# Patient Record
Sex: FEMALE | Race: WHITE | HISPANIC OR LATINO | Employment: PART TIME | URBAN - METROPOLITAN AREA
[De-identification: names, ages, dates, MRNs, and addresses within clinical notes are randomized per-mention and may not be internally consistent; named-entity substitution may affect disease eponyms.]

---

## 2020-10-23 ENCOUNTER — HOSPITAL ENCOUNTER (EMERGENCY)
Facility: HOSPITAL | Age: 40
Discharge: HOME | End: 2020-10-23
Attending: EMERGENCY MEDICINE
Payer: COMMERCIAL

## 2020-10-23 VITALS
OXYGEN SATURATION: 97 % | SYSTOLIC BLOOD PRESSURE: 114 MMHG | HEART RATE: 104 BPM | DIASTOLIC BLOOD PRESSURE: 80 MMHG | TEMPERATURE: 98 F | WEIGHT: 185.85 LBS | RESPIRATION RATE: 16 BRPM

## 2020-10-23 DIAGNOSIS — Z20.822 SUSPECTED COVID-19 VIRUS INFECTION: ICD-10-CM

## 2020-10-23 DIAGNOSIS — B34.9 VIRAL SYNDROME: Primary | ICD-10-CM

## 2020-10-23 PROCEDURE — U0003 INFECTIOUS AGENT DETECTION BY NUCLEIC ACID (DNA OR RNA); SEVERE ACUTE RESPIRATORY SYNDROME CORONAVIRUS 2 (SARS-COV-2) (CORONAVIRUS DISEASE [COVID-19]), AMPLIFIED PROBE TECHNIQUE, MAKING USE OF HIGH THROUGHPUT TECHNOLOGIES AS DESCRIBED BY CMS-2020-01-R: HCPCS | Performed by: EMERGENCY MEDICINE

## 2020-10-23 PROCEDURE — 99282 EMERGENCY DEPT VISIT SF MDM: CPT | Performed by: EMERGENCY MEDICINE

## 2020-10-23 PROCEDURE — 99283 EMERGENCY DEPT VISIT LOW MDM: CPT

## 2020-10-23 RX ORDER — ACETAMINOPHEN 325 MG/1
975 TABLET ORAL ONCE
Status: COMPLETED | OUTPATIENT
Start: 2020-10-23 | End: 2020-10-23

## 2020-10-23 RX ADMIN — ACETAMINOPHEN 975 MG: 325 TABLET ORAL at 13:26

## 2020-10-24 LAB — SARS-COV-2 RNA SPEC QL NAA+PROBE: DETECTED

## 2021-04-09 ENCOUNTER — APPOINTMENT (OUTPATIENT)
Dept: LAB | Facility: CLINIC | Age: 41
End: 2021-04-09
Payer: COMMERCIAL

## 2021-04-09 ENCOUNTER — OFFICE VISIT (OUTPATIENT)
Dept: FAMILY MEDICINE CLINIC | Facility: CLINIC | Age: 41
End: 2021-04-09

## 2021-04-09 VITALS
RESPIRATION RATE: 16 BRPM | WEIGHT: 187 LBS | HEIGHT: 67 IN | BODY MASS INDEX: 29.35 KG/M2 | TEMPERATURE: 97.8 F | DIASTOLIC BLOOD PRESSURE: 80 MMHG | OXYGEN SATURATION: 97 % | HEART RATE: 71 BPM | SYSTOLIC BLOOD PRESSURE: 106 MMHG

## 2021-04-09 DIAGNOSIS — Z00.01 ENCOUNTER FOR GENERAL ADULT MEDICAL EXAMINATION WITH ABNORMAL FINDINGS: Primary | ICD-10-CM

## 2021-04-09 DIAGNOSIS — E03.8 OTHER SPECIFIED HYPOTHYROIDISM: ICD-10-CM

## 2021-04-09 DIAGNOSIS — K21.9 GASTROESOPHAGEAL REFLUX DISEASE WITHOUT ESOPHAGITIS: ICD-10-CM

## 2021-04-09 DIAGNOSIS — R04.0 FREQUENT NOSEBLEEDS: ICD-10-CM

## 2021-04-09 DIAGNOSIS — R23.3 BRUISING, SPONTANEOUS: ICD-10-CM

## 2021-04-09 DIAGNOSIS — Z76.89 ENCOUNTER TO ESTABLISH CARE: ICD-10-CM

## 2021-04-09 DIAGNOSIS — Z00.00 HEALTHCARE MAINTENANCE: ICD-10-CM

## 2021-04-09 DIAGNOSIS — Z78.9 TELEPHONE LANGUAGE INTERPRETER SERVICE REQUIRED: ICD-10-CM

## 2021-04-09 LAB
25(OH)D3 SERPL-MCNC: 18.3 NG/ML (ref 30–100)
ALBUMIN SERPL BCP-MCNC: 3.7 G/DL (ref 3.5–5)
ALP SERPL-CCNC: 65 U/L (ref 46–116)
ALT SERPL W P-5'-P-CCNC: 33 U/L (ref 12–78)
ANION GAP SERPL CALCULATED.3IONS-SCNC: 5 MMOL/L (ref 4–13)
APTT PPP: 34 SECONDS (ref 23–37)
AST SERPL W P-5'-P-CCNC: 18 U/L (ref 5–45)
BASOPHILS # BLD AUTO: 0.02 THOUSANDS/ΜL (ref 0–0.1)
BASOPHILS NFR BLD AUTO: 0 % (ref 0–1)
BILIRUB SERPL-MCNC: 0.38 MG/DL (ref 0.2–1)
BUN SERPL-MCNC: 13 MG/DL (ref 5–25)
CALCIUM SERPL-MCNC: 9 MG/DL (ref 8.3–10.1)
CHLORIDE SERPL-SCNC: 106 MMOL/L (ref 100–108)
CHOLEST SERPL-MCNC: 226 MG/DL (ref 50–200)
CO2 SERPL-SCNC: 29 MMOL/L (ref 21–32)
CREAT SERPL-MCNC: 0.7 MG/DL (ref 0.6–1.3)
D DIMER PPP FEU-MCNC: 0.31 UG/ML FEU
EOSINOPHIL # BLD AUTO: 0.16 THOUSAND/ΜL (ref 0–0.61)
EOSINOPHIL NFR BLD AUTO: 3 % (ref 0–6)
ERYTHROCYTE [DISTWIDTH] IN BLOOD BY AUTOMATED COUNT: 13.2 % (ref 11.6–15.1)
GFR SERPL CREATININE-BSD FRML MDRD: 110 ML/MIN/1.73SQ M
GLUCOSE P FAST SERPL-MCNC: 64 MG/DL (ref 65–99)
HCT VFR BLD AUTO: 40.1 % (ref 34.8–46.1)
HDLC SERPL-MCNC: 78 MG/DL
HGB BLD-MCNC: 12.6 G/DL (ref 11.5–15.4)
IMM GRANULOCYTES # BLD AUTO: 0.01 THOUSAND/UL (ref 0–0.2)
IMM GRANULOCYTES NFR BLD AUTO: 0 % (ref 0–2)
INR PPP: 0.96 (ref 0.84–1.19)
LDLC SERPL CALC-MCNC: 133 MG/DL (ref 0–100)
LYMPHOCYTES # BLD AUTO: 1.8 THOUSANDS/ΜL (ref 0.6–4.47)
LYMPHOCYTES NFR BLD AUTO: 33 % (ref 14–44)
MCH RBC QN AUTO: 28.5 PG (ref 26.8–34.3)
MCHC RBC AUTO-ENTMCNC: 31.4 G/DL (ref 31.4–37.4)
MCV RBC AUTO: 91 FL (ref 82–98)
MONOCYTES # BLD AUTO: 0.4 THOUSAND/ΜL (ref 0.17–1.22)
MONOCYTES NFR BLD AUTO: 7 % (ref 4–12)
NEUTROPHILS # BLD AUTO: 3.14 THOUSANDS/ΜL (ref 1.85–7.62)
NEUTS SEG NFR BLD AUTO: 57 % (ref 43–75)
NONHDLC SERPL-MCNC: 148 MG/DL
NRBC BLD AUTO-RTO: 0 /100 WBCS
PLATELET # BLD AUTO: 277 THOUSANDS/UL (ref 149–390)
PMV BLD AUTO: 9.9 FL (ref 8.9–12.7)
POTASSIUM SERPL-SCNC: 4.2 MMOL/L (ref 3.5–5.3)
PROT SERPL-MCNC: 7.6 G/DL (ref 6.4–8.2)
PROTHROMBIN TIME: 12.8 SECONDS (ref 11.6–14.5)
RBC # BLD AUTO: 4.42 MILLION/UL (ref 3.81–5.12)
SODIUM SERPL-SCNC: 140 MMOL/L (ref 136–145)
TRIGL SERPL-MCNC: 73 MG/DL
TSH SERPL DL<=0.05 MIU/L-ACNC: 2.5 UIU/ML (ref 0.36–3.74)
WBC # BLD AUTO: 5.53 THOUSAND/UL (ref 4.31–10.16)

## 2021-04-09 PROCEDURE — 80053 COMPREHEN METABOLIC PANEL: CPT

## 2021-04-09 PROCEDURE — 84443 ASSAY THYROID STIM HORMONE: CPT

## 2021-04-09 PROCEDURE — 85025 COMPLETE CBC W/AUTO DIFF WBC: CPT

## 2021-04-09 PROCEDURE — 85379 FIBRIN DEGRADATION QUANT: CPT

## 2021-04-09 PROCEDURE — 80061 LIPID PANEL: CPT

## 2021-04-09 PROCEDURE — 36415 COLL VENOUS BLD VENIPUNCTURE: CPT

## 2021-04-09 PROCEDURE — 99204 OFFICE O/P NEW MOD 45 MIN: CPT | Performed by: NURSE PRACTITIONER

## 2021-04-09 PROCEDURE — 85730 THROMBOPLASTIN TIME PARTIAL: CPT

## 2021-04-09 PROCEDURE — 82306 VITAMIN D 25 HYDROXY: CPT

## 2021-04-09 PROCEDURE — 85610 PROTHROMBIN TIME: CPT

## 2021-04-09 RX ORDER — OXYMETAZOLINE HYDROCHLORIDE 0.05 G/100ML
2 SPRAY NASAL 2 TIMES DAILY
Qty: 30 ML | Refills: 0 | Status: SHIPPED | OUTPATIENT
Start: 2021-04-09

## 2021-04-09 RX ORDER — FAMOTIDINE 20 MG/1
20 TABLET, FILM COATED ORAL 2 TIMES DAILY
Qty: 30 TABLET | Refills: 5 | Status: SHIPPED | OUTPATIENT
Start: 2021-04-09 | End: 2021-09-27

## 2021-04-09 RX ORDER — MULTIVITAMIN
1 TABLET ORAL DAILY
Qty: 100 TABLET | Refills: 3 | Status: SHIPPED | OUTPATIENT
Start: 2021-04-09

## 2021-04-09 NOTE — ASSESSMENT & PLAN NOTE
BMI above normal  Counseling and plan as documented below   -Pt acknowledges understanding and agrees to lifestyle changes   -Additional handouts on diet/exercise provided   -Will monitor progress at next scheduled follow-up

## 2021-04-09 NOTE — PATIENT INSTRUCTIONS
Dieta para úlceras estomacales y gastritis   LO QUE NECESITA SABER:   More dieta para úlceras estomacales y gastritis es un plan alimenticio que limita los alimentos que irritan nance JACOBHOLM  Ciertos alimentos [employer] síntomas daniel dolor de MICHELLE, Karen, acidez estomacal o indigestión  INSTRUCCIONES SOBRE EL PAT[facility] HOSPITALARIA:   Alimentos que debe limitar o evitar: Es posible que usted necesite evitar los alimentos ácidos, picantes o altos en grasa  No todos los alimentos afectan a las personas de la [employer]  Usted necesitará aprender cuáles alimentos empeoran marta síntomas y tendrá que limitar esos alimentos  Los siguientes son algunos alimentos que podrían empeorar more úlcera o los síntomas de la gastritis:  · Bebidas:     ? Jerel Magdiel y [place]ampar[place]    ? Chocolate caliente y refrescos de cola    ? Cualquier bebida con cafeína    ? Café regular y descafeinado    ? Té de hierbabuena y menta jonatan    ? Té jonatan o pascual, regular o descafeinado    ? Yvonne Ace y toronja    ? Bebidas alcohólicas    · Especias y condimentos:     ? Moro [place] y [address]    ? Polvo de chile    ? Semilla de [facility] y Radha    · Otros alimentos:     ? Alimentos lácteos hechos de Valere Yu    ? Chocolate    ? Quesos picantes o de sabor siobhan, daniel de jalapeño o ayo radhika    ? Carne con alto contenido de grasa y [place] condimentada, daniel el [place], [place], [place], [place] y embutidos    ? Chiles picantes    ? Productos derivados del tomate, daniel pasta de [place], salsa o jugo del mismo    Alimentos que puede incluir: Consuma more variedad de alimentos saludables de todos los grupos alimenticios  Consuma frutas, verduras, granos enteros y productos lácteos descremados o bajos en grasa  Los granos [employer], los cereales, la pasta y el arroz integral  Elija la carne [place], aves de zamora (lanre y [place]), pescado, frijoles, huevos y helena secos   Un plan alimenticio saludable tiene un contenido bajo de grasas no saludables, sal y azúcar  Las grasas saludables incluyen el aceite de basurto y de canola  Solicite a nance dietista más información acerca de un plan alimenticio saludable  Otras pautas útiles:  · No coma andrea antes de acostarse  Deje de comer por lo menos 2 horas antes de acostarse  · Coma comidas pequeñas y con frecuencia  Es probable que nance estómago tolere mejor comidas pequeñas y frecuentes en vez de comidas grandes  © Copyright [address] Information is for End User's use only and may not be sold, redistributed or otherwise used for commercial purposes  All illustrations and images included in CareNotes® are the copyrighted property of [employer] D A [employer]  or [address] [place] [address] es sólo para uso en educación  Nance intención no es darle un consejo médico sobre enfermedades o tratamientos  Colsulte con nance Candelaria Neil farmacéutico antes de seguir cualquier régimen médico para saber si es seguro y efectivo para usted

## 2021-04-09 NOTE — ASSESSMENT & PLAN NOTE
Related to possible bleeding disorder as evidenced by unexplained bruising     -Afrin spray   -Will refer to ENT if other etiologies ruled out

## 2021-04-09 NOTE — PROGRESS NOTES
Assessment/Plan:    Problem List Items Addressed This Visit        Endocrine    Other specified hypothyroidism     Per pt hx hypothyroidism  +fatigue  -Check TSH level (no history on file of recent therapy)            Other    Bruising, spontaneous     No causative factors from pt history  Check initial blood tests to investigate bleeding disorders  Relevant Medications    Multiple Vitamin (multivitamin) tablet    Other Relevant Orders    CBC and differential    Protime (PT) and Partial Thromboplastin Time (PTT)    D-dimer, quantitative    Healthcare maintenance    Relevant Medications    Multiple Vitamin (multivitamin) tablet    Other Relevant Orders    TSH, 3rd generation with Free T4 reflex    Lipid panel    Comprehensive metabolic panel    Vitamin D 25 hydroxy    BMI 29 0-29 9,adult     BMI above normal  Counseling and plan as documented below   -Pt acknowledges understanding and agrees to lifestyle changes   -Additional handouts on diet/exercise provided   -Will monitor progress at next scheduled follow-up  Relevant Medications    Multiple Vitamin (multivitamin) tablet    Frequent nosebleeds     Related to possible bleeding disorder as evidenced by unexplained bruising     -Afrin spray   -Will refer to ENT if other etiologies ruled out           Relevant Medications    oxymetazoline (AFRIN) 0 05 % nasal spray      Other Visit Diagnoses     Encounter for general adult medical examination with abnormal findings    -  Primary    Gastroesophageal reflux disease without esophagitis        Relevant Medications    famotidine (PEPCID) 20 mg tablet    Encounter to establish care        Telephone  service required                Return in about 3 months (around 7/9/2021) for Annual physical     I have spent 35 minutes with Patient and family today in which greater than 50% of this time was spent in counseling/coordination of care regarding Diagnostic results, Prognosis, Risks and benefits of tx options, Intructions for management, Patient and family education, Importance of tx compliance, Risk factor reductions, and Impressions as well as reviewing recent notes from specialist visits and relevant test and imaging results  Due to language barrier, an  was present during the history-taking and subsequent discussion (and for part of the physical exam) with this patient  Subjective:     HPI: Gen Chew is a 47 y o  female who  has a past medical history of Disease of thyroid gland and Osteopenia  who presented to the office today to establish care with new PCP  She states her medical history is entirely in [facility]  Patient has complaints of unexplained bruising as well as chronic nose bleeds  She denies medical history of bleeding disorders  She endorses history of hypothyroidism  She is not at this time taking thyroid replacement therapy  She endorses symptoms of chronic fatigue  She denies cold or heat intolerance  She has no other concerns at this time  The following portions of the patient's history were reviewed and updated as appropriate: allergies, current medications, past family history, past medical history, past social history, past surgical history, and problem list     No current outpatient medications on file prior to visit  No current facility-administered medications on file prior to visit  Review of Systems   Constitutional: Positive for fatigue  Negative for fever and unexpected weight change  HENT: Positive for nosebleeds  Negative for congestion, ear pain, rhinorrhea and sore throat  Eyes: Negative for visual disturbance  Respiratory: Negative for cough and shortness of breath  Cardiovascular: Negative for chest pain  Gastrointestinal: Negative for abdominal pain, blood in stool, constipation, diarrhea, nausea and vomiting  Endocrine: Negative  Genitourinary: Negative for dysuria and hematuria  Musculoskeletal: Negative for arthralgias and back pain  Skin: Negative for rash  Allergic/Immunologic: Negative  Neurological: Negative for dizziness, syncope, light-headedness and headaches  Hematological: Bruises/bleeds easily  Psychiatric/Behavioral: Negative  All other systems reviewed and are negative  BMI Counseling: Body mass index is 29 29 kg/m²  The BMI is above normal  Nutrition recommendations include decreasing portion sizes, encouraging healthy choices of fruits and vegetables, decreasing fast food intake, consuming healthier snacks, limiting drinks that contain sugar, moderation in carbohydrate intake, increasing intake of lean protein, reducing intake of saturated and trans fat and reducing intake of cholesterol  Exercise recommendations include moderate physical activity 150 minutes/week, exercising 3-5 times per week and obtaining a gym membership  Objective:    /80 (BP Location: Left arm, Patient Position: Sitting, Cuff Size: Large)   Pulse 71   Temp 97 8 °F (36 6 °C) (Temporal)   Resp 16   Ht 5' 7" (1 702 m)   Wt 84 8 kg (187 lb)   SpO2 97%   BMI 29 29 kg/m²     Physical Exam  Vitals signs reviewed  Constitutional:       General: She is not in acute distress  Appearance: Normal appearance  HENT:      Head: Normocephalic  Right Ear: Tympanic membrane, ear canal and external ear normal  There is no impacted cerumen  Left Ear: Tympanic membrane, ear canal and external ear normal  There is no impacted cerumen  Nose: Nose normal  No congestion  Mouth/Throat:      Mouth: Mucous membranes are moist    Eyes:      Extraocular Movements: Extraocular movements intact  Conjunctiva/sclera: Conjunctivae normal       Pupils: Pupils are equal, round, and reactive to light  Neck:      Musculoskeletal: Normal range of motion and neck supple  No muscular tenderness  Cardiovascular:      Rate and Rhythm: Normal rate and regular rhythm  Pulses: Normal pulses  Heart sounds: Normal heart sounds  No murmur  No friction rub  No gallop  Pulmonary:      Effort: Pulmonary effort is normal       Breath sounds: Normal breath sounds  No wheezing  Abdominal:      General: Bowel sounds are normal       Palpations: Abdomen is soft  Tenderness: There is no abdominal tenderness  Musculoskeletal: Normal range of motion  Right lower leg: No edema  Left lower leg: No edema  Skin:     General: Skin is warm and dry  Capillary Refill: Capillary refill takes less than 2 seconds  Findings: Bruising present  Comments: Bruises on b/l arms, lower legs  Neurological:      General: No focal deficit present  Mental Status: She is alert and oriented to person, place, and time  Psychiatric:         Mood and Affect: Mood normal          Behavior: Behavior normal          Kalie DickeyANAT  04/09/21  2:36 PM    Patient Instructions     Dieta para úlceras estomacales y gastritis   LO QUE NECESITA SABER:   More dieta para úlceras estomacales y gastritis es un plan alimenticio que limita los alimentos que irritan nance BJURHOLM  Ciertos alimentos [employer] síntomas daniel dolor de Karen MARTINEZ, acidez estomacal o indigestión  INSTRUCCIONES SOBRE EL PATO HOSPITALARIA:   Alimentos que debe limitar o evitar: Es posible que usted necesite evitar los alimentos ácidos, picantes o altos en grasa  No todos los alimentos afectan a las personas de la [employer]  Usted necesitará aprender cuáles alimentos empeoran marta síntomas y tendrá que limitar esos alimentos  Los siguientes son algunos alimentos que podrían empeorar more úlcera o los síntomas de la gastritis:  · Bebidas:     ? Vilma  y [place]    ? Chocolate caliente y refrescos de cola    ? Cualquier bebida con cafeína    ? Café regular y descafeinado    ? Té de hierbabuena y menta jonatan    ? Té jonatan o pascual, regular o descafeinado    ?  Jugos de naranja y toronja    ? Bebidas alcohólicas    · Especias y condimentos:     ? Sangita Bowlegs y [address]    ? Polvo de chile    ? Semilla de mostaza y Radha    · Otros alimentos:     ? Alimentos lácteos hechos de Lovella Night    ? Chocolate    ? Quesos picantes o de sabor siobhan, daniel de jalapeño o ayo radhika    ? Carne con alto contenido de grasa y [place] condimentada, daniel el [place], [place], [place], [facility] [place] y embutidos    ? Chiles picantes    ? Productos derivados del tomate, daniel pasta de [place], salsa o jugo del mismo    Alimentos que puede incluir: Consuma more variedad de alimentos saludables de todos los grupos alimenticios  Consuma frutas, verduras, granos enteros y productos lácteos descremados o bajos en grasa  Los granos [employer], los cereales, la pasta y el arroz integral  Elija la carne [place], aves de zamora (lanre y [place]), pescado, frijoles, huevos y helena secos  Un plan alimenticio saludable tiene un contenido bajo de grasas no saludables, sal y azúcar  Las grasas saludables incluyen el aceite de basurto y de canola  Solicite a nur dietista más información acerca de un plan alimenticio saludable  Otras pautas útiles:  · No coma andrea antes de acostarse  Deje de comer por lo menos 2 horas antes de acostarse  · Coma comidas pequeñas y con frecuencia  Es probable que nur estómago tolere mejor comidas pequeñas y frecuentes en vez de comidas grandes  © Copyright [address] Information is for End User's use only and may not be sold, redistributed or otherwise used for commercial purposes  All illustrations and images included in CareNotes® are the copyrighted property of A D A M , Inc  or [address] es sólo para uso en educación  Nur intención no es darle un consejo médico sobre enfermedades o tratamientos   Colsulte con nur José Monzon farmacéutico antes de seguir cualquier régimen médico para saber si es seguro y efectivo para usted

## 2021-04-12 DIAGNOSIS — E55.9 VITAMIN D DEFICIENCY: Primary | ICD-10-CM

## 2021-04-12 RX ORDER — ERGOCALCIFEROL 1.25 MG/1
50000 CAPSULE ORAL WEEKLY
Qty: 8 CAPSULE | Refills: 0 | Status: SHIPPED | OUTPATIENT
Start: 2021-04-12

## 2021-04-12 RX ORDER — MELATONIN
1000 DAILY
Qty: 30 TABLET | Refills: 5 | Status: SHIPPED | OUTPATIENT
Start: 2021-04-12 | End: 2021-09-27 | Stop reason: SDUPTHER

## 2021-04-16 ENCOUNTER — IMMUNIZATIONS (OUTPATIENT)
Dept: FAMILY MEDICINE CLINIC | Facility: HOSPITAL | Age: 41
End: 2021-04-16

## 2021-04-16 DIAGNOSIS — Z23 ENCOUNTER FOR IMMUNIZATION: Primary | ICD-10-CM

## 2021-04-16 PROCEDURE — 0011A SARS-COV-2 / COVID-19 MRNA VACCINE (MODERNA) 100 MCG: CPT

## 2021-04-16 PROCEDURE — 91301 SARS-COV-2 / COVID-19 MRNA VACCINE (MODERNA) 100 MCG: CPT

## 2021-05-17 ENCOUNTER — IMMUNIZATIONS (OUTPATIENT)
Dept: FAMILY MEDICINE CLINIC | Facility: HOSPITAL | Age: 41
End: 2021-05-17

## 2021-05-17 DIAGNOSIS — Z23 ENCOUNTER FOR IMMUNIZATION: Primary | ICD-10-CM

## 2021-05-17 PROCEDURE — 0012A SARS-COV-2 / COVID-19 MRNA VACCINE (MODERNA) 100 MCG: CPT

## 2021-05-17 PROCEDURE — 91301 SARS-COV-2 / COVID-19 MRNA VACCINE (MODERNA) 100 MCG: CPT

## 2021-08-16 ENCOUNTER — OFFICE VISIT (OUTPATIENT)
Dept: FAMILY MEDICINE CLINIC | Facility: CLINIC | Age: 41
End: 2021-08-16

## 2021-08-16 ENCOUNTER — OFFICE VISIT (OUTPATIENT)
Dept: OBGYN CLINIC | Facility: CLINIC | Age: 41
End: 2021-08-16

## 2021-08-16 VITALS
DIASTOLIC BLOOD PRESSURE: 84 MMHG | SYSTOLIC BLOOD PRESSURE: 121 MMHG | HEIGHT: 67 IN | WEIGHT: 189 LBS | BODY MASS INDEX: 29.66 KG/M2 | HEART RATE: 75 BPM

## 2021-08-16 VITALS
WEIGHT: 188 LBS | OXYGEN SATURATION: 99 % | HEART RATE: 76 BPM | HEIGHT: 67 IN | DIASTOLIC BLOOD PRESSURE: 72 MMHG | SYSTOLIC BLOOD PRESSURE: 112 MMHG | RESPIRATION RATE: 16 BRPM | TEMPERATURE: 97.9 F | BODY MASS INDEX: 29.51 KG/M2

## 2021-08-16 DIAGNOSIS — N89.8 VAGINAL ITCHING: ICD-10-CM

## 2021-08-16 DIAGNOSIS — R19.7 DIARRHEA OF PRESUMED INFECTIOUS ORIGIN: Primary | ICD-10-CM

## 2021-08-16 DIAGNOSIS — N89.8 VAGINA ITCHING: Primary | ICD-10-CM

## 2021-08-16 PROCEDURE — 99202 OFFICE O/P NEW SF 15 MIN: CPT | Performed by: OBSTETRICS & GYNECOLOGY

## 2021-08-16 PROCEDURE — 99214 OFFICE O/P EST MOD 30 MIN: CPT | Performed by: NURSE PRACTITIONER

## 2021-08-16 RX ORDER — LOPERAMIDE HYDROCHLORIDE 2 MG/1
2 CAPSULE ORAL 4 TIMES DAILY PRN
Qty: 30 CAPSULE | Refills: 0 | Status: SHIPPED | OUTPATIENT
Start: 2021-08-16

## 2021-08-16 NOTE — PROGRESS NOTES
Assessment/Plan:    Noemí Gatica was seen today for abdominal pain  Diagnoses and all orders for this visit:    Diarrhea of presumed infectious origin  -     loperamide (IMODIUM) 2 mg capsule; Take 1 capsule (2 mg total) by mouth 4 (four) times a day as needed for diarrhea  -     Stool Enteric Bacterial Panel by PCR; Future  -     Stool culture; Future  -     H  pylori antigen, stool; Future  -     Occult blood 1-3, stool; Future    Vaginal itching  -     miconazole (MONISTAT-7) 2 % vaginal cream; Insert 1 applicator into the vagina daily at bedtime    Follow bland diet to avoid worsening sx  ED parameters discussed  Return if symptoms worsen or fail to improve  Patient Instructions   Dolor abdominal, cuidados ambulatorios   INFORMACIÓN GENERAL:   El dolor abdominal  puede ser sordo, molesto o Horomerice  Puede sentir dolor en more heavenly del abdomen o en todo el abdomen  El dolor puede deberse a ciertos estados daniel estreñimiento, sensibilidad o intoxicación alimentaria, infección o more obstrucción  Asimismo, el dolor abdominal puede deberse a more hernia, apendicitis o úlcera  La causa del dolor abdominal puede ser desconocida    Busque cuidados inmediatos para los siguientes síntomas:   · [place] dolor de pecho o falta de aliento    · Dolor pulsátil en el abdomen superior o en la parte inferior de la espalda que de repente es mariann    · Dolor que se localiza en el abdomen inferior derecho y empeora con el movimiento    · Fiebre por encima de 100 4°F (38°C) o escalofríos raf    · Vómito de todo lo que usted come y mary    · Dolor que no mejora o más sohail empeora adam las siguientes 8 a 12 horas    · Ignacio en el vómito o heces que se luc negras y alquitranadas    · La piel o el cohen de los ojos se vuelven amarillentos    · Grandes cantidades de sangrado vaginal que no es nance periodo menstrual  El tratamiento para el dolor abdominal  puede llegar a incluir medicamentos para calmar nance estómago, prevenir el vómito o disminuir el dolor  Programe more karissa con cage proveedor de [employer] se le haya indicado: Anote marta preguntas para que se acuerde de hacerlas adam marta visitas  ACUERDOS SOBRE CAGE CUIDADO:   Usted tiene el derecho de participar en la planificación de cage cuidado  Aprenda todo lo que pueda sobre cage condición y daniel darle tratamiento  Discuta con marta médicos marta opciones de tratamiento para juntos decidir el cuidado que usted quiere recibir  Usted siempre tiene el derecho a rechazar cage tratamiento  Esta información es sólo para uso en educación  Cage intención no es darle un consejo médico sobre enfermedades o tratamientos  Colsulte con cage Sherrell Nathalie farmacéutico antes de seguir cualquier régimen médico para saber si es seguro y efectivo para usted  © 2014 [address] is for End User's use only and may not be sold, redistributed or otherwise used for commercial purposes  All illustrations and images included in CareNotes® are the copyrighted property of A D A M , Inc  or Brando Blankenship  Subjective:     Amarilis Cage is a 47 y o  female who  has a past medical history of Disease of thyroid gland and Osteopenia  who presented to the office today for abdominal pain  Abdominal Pain  Patient complains of abdominal pain  The pain is described as cramping, and is 5/10 in intensity  The patient is experiencing epigastric pain without radiation  Onset was 4 days ago after eating food she doesn't usually eat  Symptoms have been unchanged  Aggravating factors: eating  Alleviating factors: none  Associated symptoms: diarrhea (loose stool x 4 times / day with blood in stool)  The patient denies anorexia, arthralagias, belching, chills, constipation, dysuria, fever, flatus, frequency, headache, hematuria, melena, myalgias, nausea, sweats and vomiting  She also c/o vaginal itching       The following portions of the patient's history were reviewed and updated as appropriate: allergies, current medications, past family history, past medical history, past social history, past surgical history and problem list     Current Outpatient Medications on File Prior to Visit   Medication Sig Dispense Refill    cholecalciferol (VITAMIN D3) 1,000 units tablet Take 1 tablet (1,000 Units total) by mouth daily 30 tablet 5    ergocalciferol (VITAMIN D2) 50,000 units Take 1 capsule (50,000 Units total) by mouth once a week 8 capsule 0    famotidine (PEPCID) 20 mg tablet Take 1 tablet (20 mg total) by mouth 2 (two) times a day 30 tablet 5    Multiple Vitamin (multivitamin) tablet Take 1 tablet by mouth daily 100 tablet 3    oxymetazoline (AFRIN) 0 05 % nasal spray 2 sprays by Each Nare route 2 (two) times a day 30 mL 0     No current facility-administered medications on file prior to visit  Review of Systems   Constitutional: Negative for chills and fever  HENT: Negative for ear pain and sore throat  Eyes: Negative for pain and visual disturbance  Respiratory: Negative for cough and shortness of breath  Cardiovascular: Negative for chest pain and palpitations  Gastrointestinal: Positive for abdominal pain and diarrhea  Negative for nausea and vomiting  Genitourinary: Negative for decreased urine volume, dysuria, flank pain, hematuria, vaginal bleeding, vaginal discharge and vaginal pain  Musculoskeletal: Negative for arthralgias and back pain  Skin: Negative for color change and rash  Neurological: Negative for seizures and syncope  All other systems reviewed and are negative  Objective:    /72 (BP Location: Left arm, Patient Position: Sitting, Cuff Size: Large)   Pulse 76   Temp 97 9 °F (36 6 °C) (Temporal)   Resp 16   Ht 5' 7" (1 702 m)   Wt 85 3 kg (188 lb)   SpO2 99%   Breastfeeding No   BMI 29 44 kg/m²     Physical Exam  Vitals and nursing note reviewed     Constitutional:       General: She is not in acute distress  Appearance: She is well-developed  She is not diaphoretic  HENT:      Head: Normocephalic and atraumatic  Right Ear: External ear normal       Left Ear: External ear normal    Eyes:      General:         Right eye: No discharge  Left eye: No discharge  Conjunctiva/sclera: Conjunctivae normal    Cardiovascular:      Rate and Rhythm: Normal rate and regular rhythm  Heart sounds: Normal heart sounds  Pulmonary:      Effort: Pulmonary effort is normal  No respiratory distress  Breath sounds: Normal breath sounds  No wheezing  Abdominal:      General: Bowel sounds are normal  There is no distension  Palpations: Abdomen is soft  Tenderness: There is generalized abdominal tenderness  There is no right CVA tenderness, left CVA tenderness, guarding or rebound  Musculoskeletal:         General: Normal range of motion  Cervical back: Normal range of motion and neck supple  Lymphadenopathy:      Cervical: No cervical adenopathy  Skin:     General: Skin is warm and dry  Capillary Refill: Capillary refill takes less than 2 seconds  Findings: No rash  Neurological:      Mental Status: She is alert and oriented to person, place, and time     Psychiatric:         Behavior: Behavior normal          ANAT Lerma  08/16/21  1:46 PM

## 2021-08-16 NOTE — PROGRESS NOTES
Assessment/Plan:     No problem-specific Assessment & Plan notes found for this encounter  Diagnoses and all orders for this visit:    Vagina itching    Monistat 7 ordered per FP   RTO for annual exam             Subjective:       Patient ID: Terrence Bose is a 47 y o  female presents with a 5 day hx of vaginal itch  She denies vaginal discharge or odor  She has given monistat 7 today per FP but was unaware of that  She has and her uterus, ovaries, and cervix removed about 10 years ago in [place]  She is unsure why but it sounds like she had high grade dysplasia of the cervix  HPI    The following portions of the patient's history were reviewed and updated as appropriate: allergies, current medications, past family history, past medical history, past social history, past surgical history and problem list     Review of Systems      Objective:      /84   Pulse 75   Ht 5' 7" (1 702 m)   Wt 85 7 kg (189 lb)   BMI 29 60 kg/m²          Physical Exam  Vitals and nursing note reviewed  Constitutional:       Appearance: Normal appearance  Pulmonary:      Effort: Pulmonary effort is normal    Genitourinary:     Labia:         Right: No rash, tenderness, lesion or injury  Left: No rash, tenderness, lesion or injury  Vagina: Normal    Neurological:      General: No focal deficit present  Mental Status: She is alert and oriented to person, place, and time     Psychiatric:         Mood and Affect: Mood normal          Behavior: Behavior normal

## 2021-08-16 NOTE — PATIENT INSTRUCTIONS
Dolor abdominal, cuidados ambulatorios   INFORMACIÓN GENERAL:   El dolor abdominal  puede ser sordo, molesto o Horomerice  Puede sentir dolor en more heavenly del abdomen o en todo el abdomen  El dolor puede deberse a ciertos estados daniel estreñimiento, sensibilidad o intoxicación alimentaria, infección o more obstrucción  Asimismo, el dolor abdominal puede deberse a more hernia, apendicitis o úlcera  La causa del dolor abdominal puede ser desconocida  Busque cuidados inmediatos para los siguientes síntomas:   · [place] dolor de pecho o falta de aliento    · Dolor pulsátil en el abdomen superior o en la parte inferior de la espalda que de repente es mariann    · Dolor que se localiza en el abdomen inferior derecho y empeora con el movimiento    · Fiebre por encima de 100 4°F (38°C) o escalofríos raf    · Vómito de todo lo que usted come y mary    · Dolor que no mejora o más sohail empeora adam las siguientes 8 a 12 horas    · Ignacio en el vómito o heces que se luc negras y alquitranadas    · La piel o el cohen de los ojos se vuelven amarillentos    · Grandes cantidades de sangrado vaginal que no es cage periodo menstrual  El tratamiento para el dolor abdominal  puede llegar a incluir medicamentos para calmar cage estómago, prevenir el vómito o disminuir el dolor  Programe more karissa con cage proveedor de [employer] se le haya indicado: Anote marta preguntas para que se acuerde de hacerlas adam marta visitas  ACUERDOS SOBRE CAGE CUIDADO:   Usted tiene el derecho de participar en la planificación de cage cuidado  Aprenda todo lo que pueda sobre cage condición y daniel darle tratamiento  Discuta con marta médicos marta opciones de tratamiento para juntos decidir el cuidado que usted quiere recibir  Usted siempre tiene el derecho a rechazar cage tratamiento  Esta información es sólo para uso en educación  Cage intención no es darle un consejo médico sobre enfermedades o tratamientos   Colsulte con cage Eden Hancock farmacéutico antes de seguir cualquier régimen médico para saber si es seguro y efectivo para usted  © 2014 [address] is for End User's use only and may not be sold, redistributed or otherwise used for commercial purposes  All illustrations and images included in CareNotes® are the copyrighted property of A D A M , Inc  or Brando Blankenship

## 2021-08-17 ENCOUNTER — APPOINTMENT (OUTPATIENT)
Dept: LAB | Facility: CLINIC | Age: 41
End: 2021-08-17
Payer: COMMERCIAL

## 2021-08-17 DIAGNOSIS — R19.7 DIARRHEA OF PRESUMED INFECTIOUS ORIGIN: ICD-10-CM

## 2021-08-17 PROCEDURE — 87338 HPYLORI STOOL AG IA: CPT

## 2021-08-17 PROCEDURE — 87505 NFCT AGENT DETECTION GI: CPT

## 2021-08-18 ENCOUNTER — APPOINTMENT (OUTPATIENT)
Dept: LAB | Facility: CLINIC | Age: 41
End: 2021-08-18
Payer: COMMERCIAL

## 2021-08-18 DIAGNOSIS — R19.7 DIARRHEA OF PRESUMED INFECTIOUS ORIGIN: Primary | ICD-10-CM

## 2021-08-18 LAB
H PYLORI AG STL QL IA: POSITIVE
HEMOCCULT STL QL IA: NEGATIVE

## 2021-08-18 PROCEDURE — G0328 FECAL BLOOD SCRN IMMUNOASSAY: HCPCS

## 2021-08-19 LAB
CAMPYLOBACTER DNA SPEC NAA+PROBE: NORMAL
SALMONELLA DNA SPEC QL NAA+PROBE: NORMAL
SHIGA TOXIN STX GENE SPEC NAA+PROBE: NORMAL
SHIGELLA DNA SPEC QL NAA+PROBE: NORMAL

## 2021-09-15 ENCOUNTER — ANNUAL EXAM (OUTPATIENT)
Dept: OBGYN CLINIC | Facility: CLINIC | Age: 41
End: 2021-09-15

## 2021-09-15 VITALS
SYSTOLIC BLOOD PRESSURE: 117 MMHG | BODY MASS INDEX: 29.44 KG/M2 | DIASTOLIC BLOOD PRESSURE: 81 MMHG | HEART RATE: 80 BPM | WEIGHT: 188 LBS

## 2021-09-15 DIAGNOSIS — Z01.419 ENCOUNTER FOR ROUTINE GYNECOLOGICAL EXAMINATION WITH PAPANICOLAOU SMEAR OF CERVIX: Primary | ICD-10-CM

## 2021-09-15 PROCEDURE — 99395 PREV VISIT EST AGE 18-39: CPT | Performed by: OBSTETRICS & GYNECOLOGY

## 2021-09-15 PROCEDURE — 88141 CYTOPATH C/V INTERPRET: CPT | Performed by: PATHOLOGY

## 2021-09-15 PROCEDURE — G0476 HPV COMBO ASSAY CA SCREEN: HCPCS | Performed by: OBSTETRICS & GYNECOLOGY

## 2021-09-15 PROCEDURE — G0145 SCR C/V CYTO,THINLAYER,RESCR: HCPCS | Performed by: PATHOLOGY

## 2021-09-15 NOTE — ASSESSMENT & PLAN NOTE
Given her history of cervical dyplasia in the past and total hysterectomy with bilateral salpingo-oophorectomy 10 yrs ago, Pap exam today in the office with high risk HPV testing  USPSTF recommends screening for cervical cancer every 3 years with cervical cytology alone in women aged 21-29 years  For women aged 33-67 years, the USPSTF recommends cervical cancer screening every 3 years with cervical cytology alone, every 5 years with high risk HPV testing alone or every 5 years with high risk HPV testing in combination with cytology (co-testing)     Continue with healthy diet, exercise and hydration  Annual mammogram starting at age 36  Will call with results

## 2021-09-15 NOTE — PATIENT INSTRUCTIONS
Prueba de extensión de Pap   LO QUE USTED DEBE SABER:   Margarita Cleveland de Pap, o prueba de Pap, es un procedimiento para buscar células anormales en nur cérvix o judie uterino  El judie uterino es more abertura estrecha en la parte inferior de [place]  El judie uterino se une a la parte superior de la vagina  INSTRUCCIONES:   Tenga seguimiento con nur médico de cabecera o con nur ginecólogo daniel se le indique: Puede ser que usted necesite regresar con nur médico para hablar [place] Jul[place] de nur procedimiento  Escriba marta preguntas a fin de que pueda recordarlas adam marta citas  Comuníquese con nur médico de cabecera o a nur ginecólogo si:   · Usted tiene sangrado vaginal más de lo esperado  · Usted no recibe marta resultados  · Usted tiene preguntas o inquietudes sobre nur condición o cuidado  © 2014 [address] is for End User's use only and may not be sold, redistributed or otherwise used for commercial purposes  All illustrations and images included in CareNotes® are the copyrighted property of A D A M , Inc  or Brando Blankenship  Esta información es sólo para uso en educación  Nur intención no es darle un consejo médico sobre enfermedades o tratamientos  Colsulte con nur Daina He farmacéutico antes de seguir cualquier régimen médico para saber si es seguro y efectivo para usted

## 2021-09-15 NOTE — PROGRESS NOTES
Assessment/Plan:    Encounter for routine gynecological examination with Papanicolaou smear of cervix  Given her history of cervical dyplasia in the past and total hysterectomy with bilateral salpingo-oophorectomy 10 yrs ago, Pap exam today in the office with high risk HPV testing  USPSTF recommends screening for cervical cancer every 3 years with cervical cytology alone in women aged 21-29 years  For women aged 33-67 years, the USPSTF recommends cervical cancer screening every 3 years with cervical cytology alone, every 5 years with high risk HPV testing alone or every 5 years with high risk HPV testing in combination with cytology (co-testing)  Continue with healthy diet, exercise and hydration  Annual mammogram starting at age 36  Will call with results         Diagnoses and all orders for this visit:    Encounter for routine gynecological examination with Papanicolaou smear of cervix  -     Liquid-based pap, screening          Subjective:      Patient ID: Monica Ramirez is a 47 y o  female  HPI     Shonna Ye is a 47 y o  female who presents today for annual GYN exam   Her last pap smear was performed 10 yrs ago in [facility] and result was abnormal   She reports history of abnormal pap smears in her past and sounds like she had high grade dysplasia  Her contraceptive method is none  Currently sexually active with one male partner  She denies vaginal bleeding/discharge/odor  She denies burning/itching to the vagina  She denies personal or family history of breast, ovarian or cervical cancer  She does recall her maternal grandmother had vaginal cancer  Paternal aunt with vaginal cancer as well  LMP: 10 yrs ago  Surgical hx: remarkable for total hysterectomy with bilateral salpingo-oophorectomy 10 years ago  OB history:   (3 vaginal and 1 )  Intimate partner violence (IPV): none     Exercise: infrequently, but she does report walking 40 minutes from home to work  Diet: reports she eats everything  BMI: 29 44    Safety at home: yes    Breast cancer screening:starts at age 36  Depression Screening Follow-up Plan: Patient's depression screening was positive with a PHQ-2 score of 2 and PHQ-9 of 8  Clinically patient does not have depression  States she feels well, no treatment is required         The following portions of the patient's history were reviewed and updated as appropriate: allergies, current medications, past family history, past medical history, past social history, past surgical history and problem list     Review of Systems   Constitutional: Negative for chills and fever  Respiratory: Negative for cough and shortness of breath  Cardiovascular: Negative for chest pain and leg swelling  Gastrointestinal: Negative for abdominal pain, constipation, diarrhea, nausea and vomiting  Genitourinary: Negative for difficulty urinating, dysuria, hematuria, pelvic pain, urgency, vaginal bleeding, vaginal discharge (white discharge, at baseline) and vaginal pain  Vaginal itching resolved   Psychiatric/Behavioral: Negative for agitation  Objective:    /81   Pulse 80   Wt 85 3 kg (188 lb)   BMI 29 44 kg/m²      Physical Exam  Vitals and nursing note reviewed  Exam conducted with a chaperone present  Constitutional:       General: She is not in acute distress  Appearance: Normal appearance  She is well-developed  She is not ill-appearing, toxic-appearing or diaphoretic  HENT:      Head: Normocephalic and atraumatic  Nose: Nose normal    Eyes:      General:         Right eye: No discharge  Left eye: No discharge  Conjunctiva/sclera: Conjunctivae normal    Cardiovascular:      Rate and Rhythm: Normal rate and regular rhythm  Heart sounds: Normal heart sounds  Pulmonary:      Effort: Pulmonary effort is normal  No respiratory distress        Breath sounds: Normal breath sounds  Chest:      Breasts:         Right: Normal  No swelling, bleeding, inverted nipple, mass, nipple discharge, skin change or tenderness  Left: Normal  No swelling, bleeding, inverted nipple, mass, nipple discharge, skin change or tenderness  Abdominal:      General: Bowel sounds are normal       Palpations: Abdomen is soft  Tenderness: There is no abdominal tenderness  Genitourinary:     General: Normal vulva  Exam position: Supine  Pubic Area: No rash  Labia:         Right: No rash, tenderness or lesion  Left: No rash, tenderness or lesion  Urethra: No urethral pain, urethral swelling or urethral lesion  Vagina: No signs of injury and foreign body  No vaginal discharge, erythema, tenderness or bleeding  Uterus: Absent  Comments: Absent cervix, and ovaries b/l  Musculoskeletal:         General: No tenderness  Normal range of motion  Cervical back: Normal range of motion and neck supple  Right lower leg: No edema  Left lower leg: No edema  Lymphadenopathy:      Upper Body:      Right upper body: No supraclavicular or axillary adenopathy  Left upper body: No supraclavicular or axillary adenopathy  Skin:     General: Skin is warm  Findings: No rash  Neurological:      Mental Status: She is alert and oriented to person, place, and time  Psychiatric:         Mood and Affect: Mood normal          Behavior: Behavior normal          Thought Content:  Thought content normal          Judgment: Judgment normal

## 2021-09-16 LAB
HPV HR 12 DNA CVX QL NAA+PROBE: NEGATIVE
HPV16 DNA CVX QL NAA+PROBE: NEGATIVE
HPV18 DNA CVX QL NAA+PROBE: POSITIVE

## 2021-09-21 ENCOUNTER — TELEPHONE (OUTPATIENT)
Dept: OBGYN CLINIC | Facility: CLINIC | Age: 41
End: 2021-09-21

## 2021-09-21 LAB
LAB AP GYN PRIMARY INTERPRETATION: ABNORMAL
Lab: ABNORMAL
PATH INTERP SPEC-IMP: ABNORMAL

## 2021-09-27 ENCOUNTER — OFFICE VISIT (OUTPATIENT)
Dept: FAMILY MEDICINE CLINIC | Facility: CLINIC | Age: 41
End: 2021-09-27

## 2021-09-27 ENCOUNTER — APPOINTMENT (OUTPATIENT)
Dept: LAB | Facility: CLINIC | Age: 41
End: 2021-09-27
Payer: COMMERCIAL

## 2021-09-27 VITALS
WEIGHT: 188.4 LBS | DIASTOLIC BLOOD PRESSURE: 70 MMHG | BODY MASS INDEX: 29.57 KG/M2 | RESPIRATION RATE: 18 BRPM | OXYGEN SATURATION: 97 % | SYSTOLIC BLOOD PRESSURE: 112 MMHG | HEART RATE: 66 BPM | TEMPERATURE: 98 F | HEIGHT: 67 IN

## 2021-09-27 DIAGNOSIS — E03.8 OTHER SPECIFIED HYPOTHYROIDISM: ICD-10-CM

## 2021-09-27 DIAGNOSIS — R23.3 BRUISING, SPONTANEOUS: ICD-10-CM

## 2021-09-27 DIAGNOSIS — E55.9 VITAMIN D DEFICIENCY: ICD-10-CM

## 2021-09-27 DIAGNOSIS — E03.8 OTHER SPECIFIED HYPOTHYROIDISM: Primary | ICD-10-CM

## 2021-09-27 DIAGNOSIS — A04.8 H. PYLORI INFECTION: ICD-10-CM

## 2021-09-27 DIAGNOSIS — Z78.9 TELEPHONE LANGUAGE INTERPRETER SERVICE REQUIRED: ICD-10-CM

## 2021-09-27 LAB — TSH SERPL DL<=0.05 MIU/L-ACNC: 2.3 UIU/ML (ref 0.36–3.74)

## 2021-09-27 PROCEDURE — 36415 COLL VENOUS BLD VENIPUNCTURE: CPT

## 2021-09-27 PROCEDURE — 84443 ASSAY THYROID STIM HORMONE: CPT

## 2021-09-27 PROCEDURE — 99214 OFFICE O/P EST MOD 30 MIN: CPT | Performed by: NURSE PRACTITIONER

## 2021-09-27 RX ORDER — LEVOTHYROXINE SODIUM 0.05 MG/1
50 TABLET ORAL
Qty: 90 TABLET | Refills: 3 | Status: SHIPPED | OUTPATIENT
Start: 2021-09-27 | End: 2022-05-19 | Stop reason: SDUPTHER

## 2021-09-27 RX ORDER — MELATONIN
1000 DAILY
Qty: 30 TABLET | Refills: 5 | Status: SHIPPED | OUTPATIENT
Start: 2021-09-27

## 2021-09-27 RX ORDER — PANTOPRAZOLE SODIUM 20 MG/1
40 TABLET, DELAYED RELEASE ORAL DAILY
Qty: 30 TABLET | Refills: 2 | Status: SHIPPED | OUTPATIENT
Start: 2021-09-27 | End: 2022-04-13 | Stop reason: SDUPTHER

## 2021-09-27 NOTE — ASSESSMENT & PLAN NOTE
Finished treatment of antibiotics  Given dyspepsia and GERD, continue protonix  If symptoms fail to resolve or worsen will recheck h  Pylori and possible refer to GI

## 2021-09-27 NOTE — PATIENT INSTRUCTIONS
Dieta para úlceras estomacales y gastritis   LO QUE NECESITA SABER:   Jaky dieta para úlceras estomacales y gastritis es un plan alimenticio que limita los alimentos que irritan nance JACOBHOLM  Ciertos alimentos [employer] síntomas daniel dolor de MICHELLE, [place], acidez estomacal o indigestión  INSTRUCCIONES SOBRE EL PATO HOSPITALARIA:   Alimentos que debe limitar o evitar: Es posible que usted necesite evitar los alimentos ácidos, picantes o altos en grasa  No todos los alimentos afectan a las personas de la [employer]  Usted necesitará aprender cuáles alimentos empeoran marta síntomas y tendrá que limitar esos alimentos  Los siguientes son algunos alimentos que podrían empeorar jaky úlcera o los síntomas de la gastritis:  · Bebidas:     ? Celine Camelia y Lance    ? Chocolate caliente y refrescos de cola    ? Cualquier bebida con cafeína    ? Café regular y descafeinado    ? Té de hierbabuena y menta jonatan    ? Té jonatan o pascual, regular o descafeinado    ? Yvonne Be y toronja    ? Bebidas alcohólicas    · Especias y condimentos:     ? Lloyd Ernie y [address]    ? Polvo de chile    ? Semilla de [facility] y Radha    · Otros alimentos:     ? Alimentos lácteos hechos de [place]    ? Chocolate    ? Quesos picantes o de sabor siobhan, daniel de jalapeño o ayo radhika    ? Carne con alto contenido de grasa y [place] condimentada, daniel el [place], [place], [place], [place] y embutidos    ? Chiles picantes    ? Productos derivados del tomate, daniel pasta de [place], salsa o jugo del mismo    Alimentos que puede incluir: Consuma jaky variedad de alimentos saludables de todos los grupos alimenticios  Consuma frutas, verduras, granos enteros y productos lácteos descremados o bajos en grasa  Los granos [employer], los cereales, la pasta y el arroz integral  Elija la carne [place], aves de zamora (lanre y Darion), pescado, frijoles, huevos y helena secos   Un plan alimenticio saludable tiene un contenido bajo de grasas no saludables, sal y azúcar  Las grasas saludables incluyen el aceite de basurto y de canola  Solicite a nance dietista más información acerca de un plan alimenticio saludable  Otras pautas útiles:  · No coma andrea antes de acostarse  Deje de comer por lo menos 2 horas antes de acostarse  · Coma comidas pequeñas y con frecuencia  Es probable que nance estómago tolere mejor comidas pequeñas y frecuentes en vez de comidas grandes  © Copyright [employer] 2021 Information is for End User's use only and may not be sold, redistributed or otherwise used for commercial purposes  All illustrations and images included in CareNotes® are the copyrighted property of [employer]  or [address] [place] [address] es sólo para uso en educación  Nance intención no es darle un consejo médico sobre enfermedades o tratamientos  Colsulte con nance Juanpablo Pier farmacéutico antes de seguir cualquier régimen médico para saber si es seguro y efectivo para usted

## 2021-09-27 NOTE — PROGRESS NOTES
Assessment/Plan:    Problem List Items Addressed This Visit        Endocrine    Other specified hypothyroidism - Primary     Refill meds at this time, advised pt to obtain new TSH 4 weeks after restarting thyroxine  Relevant Medications    levothyroxine 50 mcg tablet    Other Relevant Orders    TSH, 3rd generation with Free T4 reflex       Other    Bruising, spontaneous     Low vit D, all other labs normal   Advised to cont supplementation at this time, will recheck D levels at next encounter  H  pylori infection     Finished treatment of antibiotics  Given dyspepsia and GERD, continue protonix  If symptoms fail to resolve or worsen will recheck h  Pylori and possible refer to GI  Relevant Medications    pantoprazole (PROTONIX) 20 mg tablet      Other Visit Diagnoses     Vitamin D deficiency        Relevant Medications    cholecalciferol (VITAMIN D3) 1,000 units tablet            No follow-ups on file  A chart review was performed and previous primary care visit notes were reviewed  All applicable imaging studies were reviewed and images were reviewed personally  All applicable laboratory studies were reviewed personally  Care everywhere review was performed if  available and all pertinent notes were reviewed  Subjective:     HPI: Misty Jesus is a 47 y o  female who  has a past medical history of Disease of thyroid gland and Osteopenia  who presented to the office today for  Follow-up for hypothyroidism and recent H pylori infection  Last month patient was seen for same-day acute visit for abdominal pain  H pylori stool was positive and she was treated with full course of antibiotics and Protonix  Patient states her diarrhea has subsided however she still has dyspepsia /epigastric pain  She also states she ran out of her levothyroxine and requests a refill    She is also questioning why gynecology wants to do a colposcopy if she had a hysterectomy in the past   She was found to be low in vitamin-D at her last appointment with me which may explain her frequent bruising, she is to continue supplementation  The following portions of the patient's history were reviewed and updated as appropriate: allergies, current medications, past family history, past medical history, past social history, past surgical history, and problem list     Current Outpatient Medications on File Prior to Visit   Medication Sig Dispense Refill    ergocalciferol (VITAMIN D2) 50,000 units Take 1 capsule (50,000 Units total) by mouth once a week 8 capsule 0    loperamide (IMODIUM) 2 mg capsule Take 1 capsule (2 mg total) by mouth 4 (four) times a day as needed for diarrhea 30 capsule 0    Multiple Vitamin (multivitamin) tablet Take 1 tablet by mouth daily 100 tablet 3    [DISCONTINUED] cholecalciferol (VITAMIN D3) 1,000 units tablet Take 1 tablet (1,000 Units total) by mouth daily 30 tablet 5    [DISCONTINUED] famotidine (PEPCID) 20 mg tablet Take 1 tablet (20 mg total) by mouth 2 (two) times a day 30 tablet 5    [DISCONTINUED] pantoprazole (PROTONIX) 40 mg tablet Take 1 tablet (40 mg total) by mouth daily 14 tablet 0    miconazole (MONISTAT-7) 2 % vaginal cream Insert 1 applicator into the vagina daily at bedtime 45 g 0    oxymetazoline (AFRIN) 0 05 % nasal spray 2 sprays by Each Nare route 2 (two) times a day 30 mL 0     No current facility-administered medications on file prior to visit  Review of Systems   Constitutional: Negative  HENT: Negative  Eyes: Negative  Respiratory: Negative  Cardiovascular: Negative  Gastrointestinal: Positive for abdominal distention and abdominal pain  Negative for diarrhea, nausea and vomiting  Endocrine: Negative  Genitourinary: Negative  Musculoskeletal: Negative  Skin: Negative  Allergic/Immunologic: Negative  Neurological: Negative  Psychiatric/Behavioral: Negative                Objective:    /70 (BP Location: Left arm, Patient Position: Sitting, Cuff Size: Standard)   Pulse 66   Temp 98 °F (36 7 °C) (Temporal)   Resp 18   Ht 5' 7" (1 702 m)   Wt 85 5 kg (188 lb 6 4 oz)   SpO2 97%   BMI 29 51 kg/m²     Physical Exam  Vitals reviewed  Constitutional:       General: She is not in acute distress  Appearance: Normal appearance  HENT:      Head: Normocephalic  Right Ear: External ear normal       Left Ear: Tympanic membrane and external ear normal       Nose: Nose normal  No congestion  Mouth/Throat:      Mouth: Mucous membranes are moist    Eyes:      Extraocular Movements: Extraocular movements intact  Conjunctiva/sclera: Conjunctivae normal       Pupils: Pupils are equal, round, and reactive to light  Cardiovascular:      Rate and Rhythm: Normal rate and regular rhythm  Pulses: Normal pulses  Heart sounds: Normal heart sounds  No murmur heard  No friction rub  No gallop  Pulmonary:      Effort: Pulmonary effort is normal       Breath sounds: Normal breath sounds  No wheezing  Abdominal:      General: Bowel sounds are normal       Tenderness: There is abdominal tenderness  Musculoskeletal:         General: Normal range of motion  Cervical back: Normal range of motion and neck supple  No muscular tenderness  Right lower leg: No edema  Left lower leg: No edema  Skin:     General: Skin is warm and dry  Capillary Refill: Capillary refill takes less than 2 seconds  Neurological:      General: No focal deficit present  Mental Status: She is alert and oriented to person, place, and time  Psychiatric:         Mood and Affect: Mood normal          Behavior: Behavior normal          ANAT Shin  09/27/21  3:49 PM    Patient Instructions     Dieta para úlceras estomacales y gastritis   LO QUE NECESITA SABER:   Jaky dieta para úlceras estomacales y gastritis es un plan alimenticio que limita los alimentos que irritan nance JACOBHOLM   Ciertos alimentos podrían [employer] síntomas daniel dolor de JACOBHOLM, [place], acidez estomacal o indigestión  INSTRUCCIONES SOBRE EL PATO HOSPITALARIA:   Alimentos que debe limitar o evitar: Es posible que usted necesite evitar los alimentos ácidos, picantes o altos en grasa  No todos los alimentos afectan a las personas de la [employer]  Usted necesitará aprender cuáles alimentos empeoran marta síntomas y tendrá que limitar esos alimentos  Los siguientes son algunos alimentos que podrían empeorar more úlcera o los síntomas de la gastritis:  · Bebidas:     ? Janese Mcardle y Lance    ? Chocolate caliente y refrescos de cola    ? Cualquier bebida con cafeína    ? Café regular y descafeinado    ? Té de hierbabuena y menta jonatan    ? Té jonatan o pascual, regular o descafeinado    ? Mike No y toronja    ? Bebidas alcohólicas    · Especias y condimentos:     ? Idelle [place] y [address]    ? Polvo de chile    ? Semilla de mostaza y Radha    · Otros alimentos:     ? Alimentos lácteos hechos de [employer]    ? Chocolate    ? Quesos picantes o de sabor siobhan, daniel de jalapeño o ayo radhika    ? Carne con alto contenido de grasa y [place] condimentada, daniel el [place], [place], [place], [facility] [place] y embutidos    ? Chiles picantes    ? Productos derivados del tomate, daniel pasta de [place] city, salsa o jugo del mismo    Alimentos que puede incluir: Consuma more variedad de alimentos saludables de todos los grupos alimenticios  Consuma frutas, verduras, granos enteros y productos lácteos descremados o bajos en grasa  Los granos [employer], los cereales, la pasta y el arroz integral  Elija la ami Damon, aves de zamora (lanre y Darion), pescado, frijoles, huevos y helena secos  Un plan alimenticio saludable tiene un contenido bajo de grasas no saludables, sal y azúcar  Las grasas saludables incluyen el aceite de basurto y de canola   Solicite a nance dietista más información acerca de un plan alimenticio saludable  Otras pautas útiles:  · No coma andrea antes de acostarse  Deje de comer por lo menos 2 horas antes de acostarse  · Coma comidas pequeñas y con frecuencia  Es probable que nance estómago tolere mejor comidas pequeñas y frecuentes en vez de comidas grandes  © Copyright [employer] 2021 Information is for End User's use only and may not be sold, redistributed or otherwise used for commercial purposes  All illustrations and images included in CareNotes® are the copyrighted property of A D A [employer]  or [address] [place] [address] es sólo para uso en educación  Nance intención no es darle un consejo médico sobre enfermedades o tratamientos  Colsulte con nance [place] Candida farmacéutico antes de seguir cualquier régimen médico para saber si es seguro y efectivo para usted

## 2021-09-27 NOTE — ASSESSMENT & PLAN NOTE
Low vit D, all other labs normal   Advised to cont supplementation at this time, will recheck D levels at next encounter

## 2021-09-28 ENCOUNTER — APPOINTMENT (OUTPATIENT)
Dept: LAB | Facility: CLINIC | Age: 41
End: 2021-09-28
Payer: COMMERCIAL

## 2021-09-28 DIAGNOSIS — A04.8 H. PYLORI INFECTION: ICD-10-CM

## 2021-09-28 PROCEDURE — 87338 HPYLORI STOOL AG IA: CPT

## 2021-09-29 LAB — H PYLORI AG STL QL IA: NEGATIVE

## 2021-10-11 ENCOUNTER — APPOINTMENT (EMERGENCY)
Dept: RADIOLOGY | Facility: HOSPITAL | Age: 41
End: 2021-10-11
Payer: COMMERCIAL

## 2021-10-11 ENCOUNTER — HOSPITAL ENCOUNTER (EMERGENCY)
Facility: HOSPITAL | Age: 41
Discharge: HOME | End: 2021-10-11
Attending: EMERGENCY MEDICINE
Payer: COMMERCIAL

## 2021-10-11 VITALS
DIASTOLIC BLOOD PRESSURE: 90 MMHG | WEIGHT: 188.1 LBS | TEMPERATURE: 96.2 F | HEART RATE: 69 BPM | OXYGEN SATURATION: 100 % | BODY MASS INDEX: 29.46 KG/M2 | RESPIRATION RATE: 18 BRPM | SYSTOLIC BLOOD PRESSURE: 150 MMHG

## 2021-10-11 DIAGNOSIS — M54.50 LOW BACK PAIN: Primary | ICD-10-CM

## 2021-10-11 LAB
BILIRUB UR QL STRIP: NEGATIVE
CLARITY UR: CLEAR
COLOR UR: NORMAL
EXT PREG TEST URINE: NEGATIVE
EXT. CONTROL ED NAV: NORMAL
GLUCOSE UR STRIP-MCNC: NEGATIVE MG/DL
HGB UR QL STRIP.AUTO: NEGATIVE
KETONES UR STRIP-MCNC: NEGATIVE MG/DL
LEUKOCYTE ESTERASE UR QL STRIP: NEGATIVE
NITRITE UR QL STRIP: NEGATIVE
PH UR STRIP.AUTO: 6 [PH]
PROT UR STRIP-MCNC: NEGATIVE MG/DL
SP GR UR STRIP.AUTO: 1.01 (ref 1–1.04)
UROBILINOGEN UA: NEGATIVE MG/DL

## 2021-10-11 PROCEDURE — 73523 X-RAY EXAM HIPS BI 5/> VIEWS: CPT

## 2021-10-11 PROCEDURE — 99283 EMERGENCY DEPT VISIT LOW MDM: CPT

## 2021-10-11 PROCEDURE — 99285 EMERGENCY DEPT VISIT HI MDM: CPT | Performed by: EMERGENCY MEDICINE

## 2021-10-11 PROCEDURE — 81025 URINE PREGNANCY TEST: CPT | Performed by: EMERGENCY MEDICINE

## 2021-10-11 PROCEDURE — 96372 THER/PROPH/DIAG INJ SC/IM: CPT

## 2021-10-11 RX ORDER — LIDOCAINE 50 MG/G
1 PATCH TOPICAL ONCE
Status: DISCONTINUED | OUTPATIENT
Start: 2021-10-11 | End: 2021-10-11 | Stop reason: HOSPADM

## 2021-10-11 RX ORDER — CYCLOBENZAPRINE HCL 10 MG
10 TABLET ORAL ONCE
Status: COMPLETED | OUTPATIENT
Start: 2021-10-11 | End: 2021-10-11

## 2021-10-11 RX ORDER — CYCLOBENZAPRINE HCL 10 MG
10 TABLET ORAL 2 TIMES DAILY PRN
Qty: 20 TABLET | Refills: 0 | Status: SHIPPED | OUTPATIENT
Start: 2021-10-11

## 2021-10-11 RX ORDER — KETOROLAC TROMETHAMINE 30 MG/ML
15 INJECTION, SOLUTION INTRAMUSCULAR; INTRAVENOUS ONCE
Status: COMPLETED | OUTPATIENT
Start: 2021-10-11 | End: 2021-10-11

## 2021-10-11 RX ORDER — NAPROXEN 500 MG/1
500 TABLET ORAL 2 TIMES DAILY WITH MEALS
Qty: 30 TABLET | Refills: 0 | Status: SHIPPED | OUTPATIENT
Start: 2021-10-11 | End: 2021-12-01 | Stop reason: SDUPTHER

## 2021-10-11 RX ORDER — LIDOCAINE 50 MG/G
1 PATCH TOPICAL DAILY
Qty: 6 PATCH | Refills: 0 | Status: SHIPPED | OUTPATIENT
Start: 2021-10-11

## 2021-10-11 RX ADMIN — LIDOCAINE 1 PATCH: 50 PATCH TOPICAL at 12:42

## 2021-10-11 RX ADMIN — KETOROLAC TROMETHAMINE 15 MG: 30 INJECTION, SOLUTION INTRAMUSCULAR; INTRAVENOUS at 12:43

## 2021-10-11 RX ADMIN — CYCLOBENZAPRINE HYDROCHLORIDE 10 MG: 10 TABLET, FILM COATED ORAL at 12:43

## 2021-10-18 ENCOUNTER — TELEPHONE (OUTPATIENT)
Dept: HEMATOLOGY ONCOLOGY | Facility: CLINIC | Age: 41
End: 2021-10-18

## 2021-10-18 ENCOUNTER — OFFICE VISIT (OUTPATIENT)
Dept: OBGYN CLINIC | Facility: CLINIC | Age: 41
End: 2021-10-18

## 2021-10-18 VITALS
HEIGHT: 67 IN | BODY MASS INDEX: 29.66 KG/M2 | DIASTOLIC BLOOD PRESSURE: 81 MMHG | WEIGHT: 189 LBS | SYSTOLIC BLOOD PRESSURE: 128 MMHG

## 2021-10-18 DIAGNOSIS — R87.811 ASCUS WITH POSITIVE HIGH RISK HUMAN PAPILLOMAVIRUS OF VAGINA: Primary | ICD-10-CM

## 2021-10-18 DIAGNOSIS — R87.620 ASCUS WITH POSITIVE HIGH RISK HUMAN PAPILLOMAVIRUS OF VAGINA: Primary | ICD-10-CM

## 2021-10-18 DIAGNOSIS — Z90.721 S/P HYSTERECTOMY WITH OOPHORECTOMY: ICD-10-CM

## 2021-10-18 DIAGNOSIS — Z90.710 S/P HYSTERECTOMY WITH OOPHORECTOMY: ICD-10-CM

## 2021-10-18 PROCEDURE — 99213 OFFICE O/P EST LOW 20 MIN: CPT | Performed by: OBSTETRICS & GYNECOLOGY

## 2021-10-21 ENCOUNTER — TELEPHONE (OUTPATIENT)
Dept: PHYSICAL THERAPY | Facility: OTHER | Age: 41
End: 2021-10-21

## 2021-11-02 ENCOUNTER — CONSULT (OUTPATIENT)
Dept: GYNECOLOGIC ONCOLOGY | Facility: CLINIC | Age: 41
End: 2021-11-02
Payer: COMMERCIAL

## 2021-11-02 VITALS
HEART RATE: 69 BPM | DIASTOLIC BLOOD PRESSURE: 70 MMHG | OXYGEN SATURATION: 98 % | WEIGHT: 189 LBS | HEIGHT: 67 IN | SYSTOLIC BLOOD PRESSURE: 130 MMHG | TEMPERATURE: 97 F | RESPIRATION RATE: 16 BRPM | BODY MASS INDEX: 29.66 KG/M2

## 2021-11-02 DIAGNOSIS — R87.620 ASCUS WITH POSITIVE HIGH RISK HUMAN PAPILLOMAVIRUS OF VAGINA: ICD-10-CM

## 2021-11-02 DIAGNOSIS — R87.811 ASCUS WITH POSITIVE HIGH RISK HUMAN PAPILLOMAVIRUS OF VAGINA: ICD-10-CM

## 2021-11-02 DIAGNOSIS — N89.3 VAGINAL DYSPLASIA: Primary | ICD-10-CM

## 2021-11-02 PROCEDURE — 99242 OFF/OP CONSLTJ NEW/EST SF 20: CPT | Performed by: OBSTETRICS & GYNECOLOGY

## 2021-11-02 PROCEDURE — 57420 EXAM OF VAGINA W/SCOPE: CPT | Performed by: OBSTETRICS & GYNECOLOGY

## 2021-12-01 ENCOUNTER — OFFICE VISIT (OUTPATIENT)
Dept: FAMILY MEDICINE CLINIC | Facility: CLINIC | Age: 41
End: 2021-12-01

## 2021-12-01 ENCOUNTER — APPOINTMENT (OUTPATIENT)
Dept: LAB | Facility: CLINIC | Age: 41
End: 2021-12-01
Payer: COMMERCIAL

## 2021-12-01 VITALS
DIASTOLIC BLOOD PRESSURE: 80 MMHG | WEIGHT: 191 LBS | OXYGEN SATURATION: 99 % | RESPIRATION RATE: 18 BRPM | HEART RATE: 74 BPM | HEIGHT: 67 IN | TEMPERATURE: 97.5 F | SYSTOLIC BLOOD PRESSURE: 118 MMHG | BODY MASS INDEX: 29.98 KG/M2

## 2021-12-01 DIAGNOSIS — M54.50 LOW BACK PAIN: ICD-10-CM

## 2021-12-01 LAB
ALBUMIN SERPL BCP-MCNC: 3.8 G/DL (ref 3.5–5)
ALP SERPL-CCNC: 63 U/L (ref 46–116)
ALT SERPL W P-5'-P-CCNC: 26 U/L (ref 12–78)
ANION GAP SERPL CALCULATED.3IONS-SCNC: 4 MMOL/L (ref 4–13)
AST SERPL W P-5'-P-CCNC: 11 U/L (ref 5–45)
BILIRUB SERPL-MCNC: 0.29 MG/DL (ref 0.2–1)
BUN SERPL-MCNC: 12 MG/DL (ref 5–25)
CALCIUM SERPL-MCNC: 9.2 MG/DL (ref 8.3–10.1)
CHLORIDE SERPL-SCNC: 107 MMOL/L (ref 100–108)
CO2 SERPL-SCNC: 28 MMOL/L (ref 21–32)
CREAT SERPL-MCNC: 0.64 MG/DL (ref 0.6–1.3)
GFR SERPL CREATININE-BSD FRML MDRD: 113 ML/MIN/1.73SQ M
GLUCOSE P FAST SERPL-MCNC: 86 MG/DL (ref 65–99)
POTASSIUM SERPL-SCNC: 4 MMOL/L (ref 3.5–5.3)
PROT SERPL-MCNC: 7.3 G/DL (ref 6.4–8.2)
SODIUM SERPL-SCNC: 139 MMOL/L (ref 136–145)

## 2021-12-01 PROCEDURE — 36415 COLL VENOUS BLD VENIPUNCTURE: CPT

## 2021-12-01 PROCEDURE — 80053 COMPREHEN METABOLIC PANEL: CPT

## 2021-12-01 PROCEDURE — 99213 OFFICE O/P EST LOW 20 MIN: CPT | Performed by: FAMILY MEDICINE

## 2021-12-01 RX ORDER — CYCLOBENZAPRINE HCL 10 MG
10 TABLET ORAL 3 TIMES DAILY PRN
Qty: 30 TABLET | Refills: 0 | Status: SHIPPED | OUTPATIENT
Start: 2021-12-01

## 2021-12-01 RX ORDER — NAPROXEN 500 MG/1
500 TABLET ORAL 2 TIMES DAILY WITH MEALS
Qty: 30 TABLET | Refills: 0 | Status: SHIPPED | OUTPATIENT
Start: 2021-12-01 | End: 2022-01-04 | Stop reason: SDUPTHER

## 2021-12-14 ENCOUNTER — HOSPITAL ENCOUNTER (EMERGENCY)
Facility: HOSPITAL | Age: 41
Discharge: HOME | End: 2021-12-14
Attending: EMERGENCY MEDICINE
Payer: COMMERCIAL

## 2021-12-14 VITALS
OXYGEN SATURATION: 100 % | BODY MASS INDEX: 30.48 KG/M2 | SYSTOLIC BLOOD PRESSURE: 162 MMHG | HEART RATE: 77 BPM | WEIGHT: 194.6 LBS | RESPIRATION RATE: 18 BRPM | DIASTOLIC BLOOD PRESSURE: 95 MMHG | TEMPERATURE: 98.7 F

## 2021-12-14 DIAGNOSIS — M54.32 SCIATICA OF LEFT SIDE: Primary | ICD-10-CM

## 2021-12-14 PROCEDURE — 99283 EMERGENCY DEPT VISIT LOW MDM: CPT

## 2021-12-14 PROCEDURE — 99284 EMERGENCY DEPT VISIT MOD MDM: CPT | Performed by: EMERGENCY MEDICINE

## 2021-12-14 RX ORDER — TRAMADOL HYDROCHLORIDE 50 MG/1
50 TABLET ORAL ONCE
Status: COMPLETED | OUTPATIENT
Start: 2021-12-14 | End: 2021-12-14

## 2021-12-14 RX ORDER — PREDNISONE 20 MG/1
20 TABLET ORAL DAILY
Qty: 4 TABLET | Refills: 0 | Status: SHIPPED | OUTPATIENT
Start: 2021-12-14 | End: 2021-12-18

## 2021-12-14 RX ORDER — TRAMADOL HYDROCHLORIDE 50 MG/1
50 TABLET ORAL EVERY 6 HOURS PRN
Qty: 10 TABLET | Refills: 5 | Status: SHIPPED | OUTPATIENT
Start: 2021-12-14 | End: 2021-12-19

## 2021-12-14 RX ADMIN — PREDNISONE 50 MG: 20 TABLET ORAL at 16:04

## 2021-12-14 RX ADMIN — TRAMADOL HYDROCHLORIDE 50 MG: 50 TABLET, FILM COATED ORAL at 16:06

## 2021-12-15 ENCOUNTER — TELEPHONE (OUTPATIENT)
Dept: PHYSICAL THERAPY | Facility: OTHER | Age: 41
End: 2021-12-15

## 2021-12-20 ENCOUNTER — EVALUATION (OUTPATIENT)
Dept: PHYSICAL THERAPY | Facility: CLINIC | Age: 41
End: 2021-12-20
Payer: COMMERCIAL

## 2021-12-20 DIAGNOSIS — G89.29 CHRONIC BILATERAL LOW BACK PAIN WITH LEFT-SIDED SCIATICA: Primary | ICD-10-CM

## 2021-12-20 DIAGNOSIS — M54.42 CHRONIC BILATERAL LOW BACK PAIN WITH LEFT-SIDED SCIATICA: Primary | ICD-10-CM

## 2021-12-20 PROCEDURE — 97162 PT EVAL MOD COMPLEX 30 MIN: CPT | Performed by: PHYSICAL THERAPIST

## 2021-12-27 ENCOUNTER — TELEPHONE (OUTPATIENT)
Dept: FAMILY MEDICINE CLINIC | Facility: CLINIC | Age: 41
End: 2021-12-27

## 2021-12-28 ENCOUNTER — TELEPHONE (OUTPATIENT)
Dept: FAMILY MEDICINE CLINIC | Facility: CLINIC | Age: 41
End: 2021-12-28

## 2021-12-28 DIAGNOSIS — M54.42 CHRONIC BILATERAL LOW BACK PAIN WITH BILATERAL SCIATICA: Primary | ICD-10-CM

## 2021-12-28 DIAGNOSIS — G89.29 CHRONIC BILATERAL LOW BACK PAIN WITH BILATERAL SCIATICA: Primary | ICD-10-CM

## 2021-12-28 DIAGNOSIS — M54.41 CHRONIC BILATERAL LOW BACK PAIN WITH BILATERAL SCIATICA: Primary | ICD-10-CM

## 2022-01-03 ENCOUNTER — HOSPITAL ENCOUNTER (OUTPATIENT)
Dept: RADIOLOGY | Facility: HOSPITAL | Age: 42
Discharge: HOME | End: 2022-01-03
Payer: COMMERCIAL

## 2022-01-03 DIAGNOSIS — G89.29 CHRONIC BILATERAL LOW BACK PAIN WITH BILATERAL SCIATICA: ICD-10-CM

## 2022-01-03 DIAGNOSIS — M54.41 CHRONIC BILATERAL LOW BACK PAIN WITH BILATERAL SCIATICA: ICD-10-CM

## 2022-01-03 DIAGNOSIS — M54.42 CHRONIC BILATERAL LOW BACK PAIN WITH BILATERAL SCIATICA: ICD-10-CM

## 2022-01-03 PROCEDURE — 72110 X-RAY EXAM L-2 SPINE 4/>VWS: CPT

## 2022-01-04 DIAGNOSIS — M54.50 LOW BACK PAIN: ICD-10-CM

## 2022-01-04 RX ORDER — NAPROXEN 500 MG/1
500 TABLET ORAL 2 TIMES DAILY WITH MEALS
Qty: 30 TABLET | Refills: 0 | Status: SHIPPED | OUTPATIENT
Start: 2022-01-04 | End: 2022-01-06 | Stop reason: SDUPTHER

## 2022-01-04 NOTE — TELEPHONE ENCOUNTER
Pt would like to receive a phone call as soon as xray results are in       Also pt would like a refill on,    naproxen (Naprosyn) 500 mg tablet

## 2022-01-06 ENCOUNTER — TELEPHONE (OUTPATIENT)
Dept: FAMILY MEDICINE CLINIC | Facility: CLINIC | Age: 42
End: 2022-01-06

## 2022-01-06 DIAGNOSIS — M54.50 LOW BACK PAIN: ICD-10-CM

## 2022-01-06 RX ORDER — NAPROXEN 500 MG/1
500 TABLET ORAL 2 TIMES DAILY WITH MEALS
Qty: 30 TABLET | Refills: 0 | Status: CANCELLED | OUTPATIENT
Start: 2022-01-06

## 2022-01-06 NOTE — TELEPHONE ENCOUNTER
Jonatan Orozco,    Please let her know the xray was normal but a little disc narrowing in the lower back - this means there is a some mild compression happening between the discs in your lower back that can be causing pressure on the nerves  The best thing to do is physical therapy to relieve this pressure and follow up with Pain Medicine appointment on January 13th, they will talk about other pain relief options  Naproxen refilled

## 2022-01-11 ENCOUNTER — TELEPHONE (OUTPATIENT)
Dept: PAIN MEDICINE | Facility: CLINIC | Age: 42
End: 2022-01-11

## 2022-01-11 NOTE — TELEPHONE ENCOUNTER
Thai patient, requesting sooner appt can you check and see if we can add her to schedule sooner, I initially placed her in [facility] but later found out [facility] should be her office  Any help would be great  I speak Maltese I can call her and reschedule the appt       Thank you

## 2022-02-11 ENCOUNTER — CONSULT (OUTPATIENT)
Dept: PAIN MEDICINE | Facility: CLINIC | Age: 42
End: 2022-02-11
Payer: COMMERCIAL

## 2022-02-11 VITALS
SYSTOLIC BLOOD PRESSURE: 162 MMHG | HEIGHT: 67 IN | DIASTOLIC BLOOD PRESSURE: 90 MMHG | BODY MASS INDEX: 30.61 KG/M2 | WEIGHT: 195 LBS

## 2022-02-11 DIAGNOSIS — M54.42 ACUTE BILATERAL LOW BACK PAIN WITH LEFT-SIDED SCIATICA: Primary | ICD-10-CM

## 2022-02-11 DIAGNOSIS — M70.62 GREATER TROCHANTERIC BURSITIS OF LEFT HIP: ICD-10-CM

## 2022-02-11 DIAGNOSIS — M79.18 MYOFASCIAL PAIN SYNDROME: ICD-10-CM

## 2022-02-11 DIAGNOSIS — G57.02 PIRIFORMIS SYNDROME OF LEFT SIDE: ICD-10-CM

## 2022-02-11 PROCEDURE — 99244 OFF/OP CNSLTJ NEW/EST MOD 40: CPT | Performed by: PHYSICAL MEDICINE & REHABILITATION

## 2022-02-11 RX ORDER — MELOXICAM 15 MG/1
15 TABLET ORAL DAILY
Qty: 30 TABLET | Refills: 1 | Status: SHIPPED | OUTPATIENT
Start: 2022-02-11 | End: 2022-04-13 | Stop reason: SDUPTHER

## 2022-02-11 NOTE — PROGRESS NOTES
Assessment:  1  Acute bilateral low back pain with left-sided sciatica    2  Greater trochanteric bursitis of left hip    3  Piriformis syndrome of left side    4  Myofascial pain syndrome        Plan:  Ms Blayne Dudley is a pleasant 42-year-old female who presents for initial evaluation regarding 3 months duration of bilateral low back pain with radiating symptoms into the left glutes and left leg  During today's evaluation she is demonstrating pain that is likely multifactorial nature with clinical evidence of suspected main pain generating from the left greater trochanter, left glutes and lumbar spine  She has not participated in any formal physical therapy program   At this time we will  1  Plan for left-sided piriformis injection under fluoro guidance  2  Will also plan for left greater trochanteric bursa steroid injection under ultrasound guidance  We will space out these procedures greater than 14 days apart  3  Will place the patient in formal physical therapy program x4 weeks or longer if needed  4  Will start meloxicam 15 mg daily   5  Complete risks and benefits including bleeding, infection, tissue reaction, nerve injury and allergic reaction were discussed  The approach was demonstrated using models and literature was provided  Verbal and written consent was obtained  History of Present Illness:    Manuela Armijo is a 46 y o  female who presents to [facility] for initial evaluation of the above stated pain complaints  Today patient reports 3 months duration of bilateral low back and buttock pain  Denies any significant inciting event or recent trauma  Today reports moderate to severe pain rated 3 to 7/10 and interfering with daily activities  Pain is intermittent 30-60% of the time that is worse in the afternoon  Describes symptoms as pressure-like, throbbing  Also reports lower extremity weakness but denies falls    Does not use any durable medical equipment for ambulation  Symptoms are worse with standing, sitting, walking  Has had no significant relief from home exercises and rest   Admits to moderate relief with heat  Previously tried meloxicam with some relief  Presents today for initial evaluation  Review of Systems:    Review of Systems   Constitutional: Positive for fatigue  HENT: Negative  Eyes: Positive for itching  Respiratory: Negative  Cardiovascular: Negative  Gastrointestinal: Negative  Genitourinary: Negative  Musculoskeletal: Positive for back pain  Skin: Negative  Allergic/Immunologic: Negative  Neurological: Positive for weakness, light-headedness, numbness and headaches  Hematological: Bruises/bleeds easily  Patient Active Problem List   Diagnosis    Bruising, spontaneous    Healthcare maintenance    BMI 29 0-29 9,adult    Other specified hypothyroidism    Frequent nosebleeds    Encounter for routine gynecological examination with Papanicolaou smear of cervix    H  pylori infection    Vaginal dysplasia    Pain in left lumbar region of back       Past Medical History:   Diagnosis Date    Disease of thyroid gland     Osteopenia        Past Surgical History:   Procedure Laterality Date    CERVICAL BIOPSY  W/ LOOP ELECTRODE EXCISION N/A      SECTION N/A     LEFT OOPHORECTOMY Left     TOTAL ABDOMINAL HYSTERECTOMY W/ BILATERAL SALPINGOOPHORECTOMY Right        History reviewed  No pertinent family history  Social History     Occupational History    Not on file   Tobacco Use    Smoking status: Never Smoker    Smokeless tobacco: Never Used   Vaping Use    Vaping Use: Never used   Substance and Sexual Activity    Alcohol use:  Yes    Drug use: Never    Sexual activity: Yes     Partners: Male         Current Outpatient Medications:     cholecalciferol (VITAMIN D3) 1,000 units tablet, Take 1 tablet (1,000 Units total) by mouth daily, Disp: 30 tablet, Rfl: 5    ergocalciferol (VITAMIN D2) 50,000 units, Take 1 capsule (50,000 Units total) by mouth once a week, Disp: 8 capsule, Rfl: 0    lidocaine (Lidoderm) 5 %, Apply 1 patch topically daily Remove & Discard patch within 12 hours or as directed by MD, Disp: 6 patch, Rfl: 0    Multiple Vitamin (multivitamin) tablet, Take 1 tablet by mouth daily, Disp: 100 tablet, Rfl: 3    oxymetazoline (AFRIN) 0 05 % nasal spray, 2 sprays by Each Nare route 2 (two) times a day, Disp: 30 mL, Rfl: 0    pantoprazole (PROTONIX) 20 mg tablet, Take 2 tablets (40 mg total) by mouth daily, Disp: 30 tablet, Rfl: 2    cyclobenzaprine (FLEXERIL) 10 mg tablet, Take 1 tablet (10 mg total) by mouth 2 (two) times a day as needed for muscle spasms (Patient not taking: Reported on 2/11/2022 ), Disp: 20 tablet, Rfl: 0    cyclobenzaprine (FLEXERIL) 10 mg tablet, Take 1 tablet (10 mg total) by mouth 3 (three) times a day as needed for muscle spasms (Patient not taking: Reported on 2/11/2022 ), Disp: 30 tablet, Rfl: 0    levothyroxine 50 mcg tablet, Take 1 tablet (50 mcg total) by mouth daily in the early morning, Disp: 90 tablet, Rfl: 3    loperamide (IMODIUM) 2 mg capsule, Take 1 capsule (2 mg total) by mouth 4 (four) times a day as needed for diarrhea (Patient not taking: Reported on 2/11/2022 ), Disp: 30 capsule, Rfl: 0    meloxicam (Mobic) 15 mg tablet, Take 1 tablet (15 mg total) by mouth daily, Disp: 30 tablet, Rfl: 1    miconazole (MONISTAT-7) 2 % vaginal cream, Insert 1 applicator into the vagina daily at bedtime (Patient not taking: Reported on 2/11/2022 ), Disp: 45 g, Rfl: 0    No Known Allergies    Physical Exam:    /90   Ht 5' 7" (1 702 m)   Wt 88 5 kg (195 lb)   BMI 30 54 kg/m²     Constitutional: normal, well developed, well nourished, alert, in no distress and non-toxic and no overt pain behavior    Eyes: anicteric  HEENT: grossly intact  Neck: supple, symmetric, trachea midline and no masses   Pulmonary:even and unlabored  Cardiovascular:No edema or pitting edema present  Skin:Normal without rashes or lesions and well hydrated  Psychiatric:Mood and affect appropriate  Neurologic:Cranial Nerves II-XII grossly intact  Musculoskeletal:antalgic, tenderness to palpation left glutes and greater trochanter, decreased active and passive range of motion with lumbar flexion and extension limited by pain, MMT 5/5 bilateral lower extremities, sensation grossly intact to light touch, DTRs within normal limits   Positive straight leg raise in the supine position with radicular pain into the left leg,  Positive FADIR left-sided     Imaging  US guidance    (Results Pending)   FL spine and pain procedure    (Results Pending)       Orders Placed This Encounter   Procedures    US guidance    FL spine and pain procedure    Ambulatory referral to Physical Therapy

## 2022-02-11 NOTE — PATIENT INSTRUCTIONS
Piriformis Syndrome   WHAT YOU NEED TO KNOW:   Piriformis syndrome is sciatic nerve pain caused by an injured or overused piriformis muscle  This is a muscle inside your buttocks that helps you move your leg  The pain is caused when this muscle pinches your sciatic nerve  You may feel the pain in your hip or down your leg  DISCHARGE INSTRUCTIONS:   Medicines: You may need any of the following:  · Prescription medicines  may be used to relax your muscles and decrease pain and swelling  · NSAIDs  help decrease swelling and pain  This medicine is available with or without a doctor's order  NSAIDs can cause stomach bleeding or kidney problems in certain people  If you take blood thinner medicine, always ask your healthcare provider if NSAIDs are safe for you  Always read the medicine label and follow directions  · Acetaminophen  decreases pain  It is available without a doctor's order  Ask how much to take and how often to take it  Follow directions  Acetaminophen can cause liver damage if not taken correctly  · Take your medicine as directed  Contact your healthcare provider if you think your medicine is not helping or if you have side effects  Tell him or her if you are allergic to any medicine  Keep a list of the medicines, vitamins, and herbs you take  Include the amounts, and when and why you take them  Bring the list or the pill bottles to follow-up visits  Carry your medicine list with you in case of an emergency  Follow up with your healthcare provider as directed: You may need to return for more tests  You may also be referred to a physical therapist  Write down your questions so you remember to ask them during your visits  Manage your symptoms of piriformis syndrome:   · Rest as directed  Avoid activities that make your pain worse  · Apply ice to the buttock on your injured side  Use an ice pack, or put crushed ice in a plastic bag  Cover it with a towel   Leave the ice on for 15 to 20 minutes every hour, or as directed  Ice helps prevent tissue damage and decreases swelling and pain  · Apply heat to the buttock on your injured side  Use heating pads for 20 to 30 minutes every 2 hours for as many days as directed  Heat helps decrease pain and muscle spasms  · Stretch as directed  Lie on your back with your knees bent  Place the ankle of your injured leg on the knee of your other leg  Gently pull your bent leg toward your chest, until you feel a stretch in the buttock of your injured leg  A physical therapist may show you other exercises to stretch and strengthen your hip muscles  Contact your healthcare provider if:   · Your pain worsens or returns, even with treatment  · You have questions or concerns about your condition or care  Return to the emergency department if:   · You cannot move your leg or foot  © Copyright [employer] 2021 Information is for End User's use only and may not be sold, redistributed or otherwise used for commercial purposes  All illustrations and images included in CareNotes® are the copyrighted property of A D A M , Inc  or [facility]   The above information is an  only  It is not intended as medical advice for individual conditions or treatments  Talk to your doctor, nurse or pharmacist before following any medical regimen to see if it is safe and effective for you

## 2022-02-28 ENCOUNTER — EVALUATION (OUTPATIENT)
Dept: PHYSICAL THERAPY | Facility: CLINIC | Age: 42
End: 2022-02-28
Payer: COMMERCIAL

## 2022-02-28 DIAGNOSIS — M70.62 GREATER TROCHANTERIC BURSITIS OF LEFT HIP: ICD-10-CM

## 2022-02-28 DIAGNOSIS — M54.42 ACUTE BILATERAL LOW BACK PAIN WITH LEFT-SIDED SCIATICA: ICD-10-CM

## 2022-02-28 DIAGNOSIS — M79.18 MYOFASCIAL PAIN SYNDROME: ICD-10-CM

## 2022-02-28 DIAGNOSIS — G57.02 PIRIFORMIS SYNDROME OF LEFT SIDE: ICD-10-CM

## 2022-02-28 PROCEDURE — 97162 PT EVAL MOD COMPLEX 30 MIN: CPT | Performed by: PHYSICAL THERAPIST

## 2022-02-28 PROCEDURE — 97014 ELECTRIC STIMULATION THERAPY: CPT | Performed by: PHYSICAL THERAPIST

## 2022-02-28 PROCEDURE — 97112 NEUROMUSCULAR REEDUCATION: CPT | Performed by: PHYSICAL THERAPIST

## 2022-02-28 NOTE — PROGRESS NOTES
PT Evaluation     Today's date: 2022  Patient name: Balbir Baugh  : 1980  MRN: [id number]  Referring provider: Carolee Ford DO  Dx:   Encounter Diagnosis     ICD-10-CM    1  Greater trochanteric bursitis of left hip  M70 62 Ambulatory referral to Physical Therapy   2  Piriformis syndrome of left side  G57 02 Ambulatory referral to Physical Therapy   3  Myofascial pain syndrome  M79 18 Ambulatory referral to Physical Therapy   4  Acute bilateral low back pain with left-sided sciatica  M54 42 Ambulatory referral to Physical Therapy                  Assessment  Assessment details: Pt is a 46y o  year old female presenting to physical therapy for Greater trochanteric bursitis of left hip, Piriformis syndrome of left side, Myofascial pain syndrome, and Acute bilateral low back pain with left-sided sciatica  She presents with the following impairments: lumbar ROM deficits, L LE weakness, hypermobility in lumbar spine, (+) slump and pSLR on L LE, and core activation deficits affecting her function with walking and standing for extended periods, lifting, squatting, bending, and working as   Pt will benefit from skilled physical therapy to address functional limitations noted in evaluation and meet patient goals  Impairments: abnormal or restricted ROM, abnormal movement, activity intolerance, impaired physical strength, lacks appropriate home exercise program and pain with function    Symptom irritability: moderate  Goals  ST  Pt will reduce pain to 3/10 at rest   2  Pt will improve TA activaiton to good  LT  Pt will be able to stand and walk 1+ hours without pain  2  Pt will improve L hip flexion to 4+/5 for improved squatting ability     3  Pt will be I w HEP    Plan  Patient would benefit from: PT eval and skilled physical therapy  Planned modality interventions: biofeedback, electrical stimulation/Russian stimulation, cryotherapy, TENS, thermotherapy: hydrocollator packs and unattended electrical stimulation  Planned therapy interventions: joint mobilization, manual therapy, abdominal trunk stabilization, activity modification, neuromuscular re-education, patient education, stretching, strengthening, therapeutic activities, therapeutic exercise, flexibility, functional ROM exercises and home exercise program  Frequency: 2x week  Duration in weeks: 6  Treatment plan discussed with: patient        Subjective Evaluation    History of Present Illness  Mechanism of injury: Pt reports back and L hip pain for 4-5 months  Pt denies any trauma or falls that injured her back  She works at [employer] and is standing all day  Her pain is constant and worse with working and and lifting  She has numbness and tingling down her L LE  Recurrent probem    Pain  Current pain ratin  At worst pain ratin      Diagnostic Tests  X-ray: normal        Objective     Concurrent Complaints  Positive for night pain and disturbed sleep  Negative for history of trauma    Active Range of Motion     Lumbar   Flexion:  Restriction level: minimal  Extension:  WFL and with pain  Left lateral flexion:  WFL  Right lateral flexion:  WFL  Left rotation:  WFL  Right rotation:  [facility]    Joint Play     Hypermobile: L1, L2, L3, L4, L5 and S1     Hypomobile: T8, T9, T10, T11 and T12     Pain: T8, T9, T10, L1, L2, L3, L4, L5 and S1     Strength/Myotome Testing     Lumbar   Left   Heel walk: normal  Toe walk: normal    Right   Heel walk: normal  Toe walk: normal    Left Hip   Planes of Motion   Flexion: 3+    Right Hip   Planes of Motion   Flexion: 4+    Left Knee   Flexion: 4+  Extension: 4+    Right Knee   Flexion: 5  Extension: 5    Muscle Activation   Patient unable to activate left transverse abdominals and right transverse abdominals  Tests     Lumbar   Negative prone instability        Left   Positive passive SLR and slump test      Right   Negative passive SLR and slump test  Precautions: Irish Speaking     Date 2/28            Visit # 1            FOTO 53/65             Re-eval IE              Manuals 2/28            Paraspinals STM                                                    Neuro Re-Ed             TA Bracing 3x10"            Supine Marches HEP            Bridges HEP            Clams             Sciatic Nerve Glides             Bird Dogs             No Money             Ther Ex             Bike             Rows/Ext             REIL HEP            SLR HEP            S/l hip ABD             UTR             QL str             Bessy Holt             Ther Activity             Squat                          Gait Training                                       Modalities             IFC 10'

## 2022-03-03 ENCOUNTER — HOSPITAL ENCOUNTER (OUTPATIENT)
Dept: RADIOLOGY | Facility: MEDICAL CENTER | Age: 42
Discharge: HOME | End: 2022-03-03
Attending: PHYSICAL MEDICINE & REHABILITATION | Admitting: PHYSICAL MEDICINE & REHABILITATION
Payer: COMMERCIAL

## 2022-03-03 VITALS
RESPIRATION RATE: 20 BRPM | HEART RATE: 78 BPM | SYSTOLIC BLOOD PRESSURE: 123 MMHG | TEMPERATURE: 97.5 F | DIASTOLIC BLOOD PRESSURE: 85 MMHG | OXYGEN SATURATION: 95 %

## 2022-03-03 DIAGNOSIS — M79.18 MYOFASCIAL PAIN SYNDROME: ICD-10-CM

## 2022-03-03 DIAGNOSIS — G57.02 PIRIFORMIS SYNDROME OF LEFT SIDE: ICD-10-CM

## 2022-03-03 DIAGNOSIS — M54.42 ACUTE BILATERAL LOW BACK PAIN WITH LEFT-SIDED SCIATICA: ICD-10-CM

## 2022-03-03 DIAGNOSIS — M70.62 GREATER TROCHANTERIC BURSITIS OF LEFT HIP: ICD-10-CM

## 2022-03-03 PROCEDURE — 77002 NEEDLE LOCALIZATION BY XRAY: CPT | Performed by: PHYSICAL MEDICINE & REHABILITATION

## 2022-03-03 PROCEDURE — 20552 NJX 1/MLT TRIGGER POINT 1/2: CPT | Performed by: PHYSICAL MEDICINE & REHABILITATION

## 2022-03-03 RX ORDER — TRAMADOL HYDROCHLORIDE 50 MG/1
TABLET ORAL
COMMUNITY
Start: 2022-02-12

## 2022-03-03 RX ORDER — BUPIVACAINE HCL/PF 2.5 MG/ML
10 VIAL (ML) INJECTION ONCE
Status: COMPLETED | OUTPATIENT
Start: 2022-03-03 | End: 2022-03-03

## 2022-03-03 RX ORDER — METHYLPREDNISOLONE ACETATE 80 MG/ML
80 INJECTION, SUSPENSION INTRA-ARTICULAR; INTRALESIONAL; INTRAMUSCULAR; PARENTERAL; SOFT TISSUE ONCE
Status: COMPLETED | OUTPATIENT
Start: 2022-03-03 | End: 2022-03-03

## 2022-03-03 RX ORDER — LIDOCAINE HYDROCHLORIDE 10 MG/ML
5 INJECTION, SOLUTION EPIDURAL; INFILTRATION; INTRACAUDAL; PERINEURAL ONCE
Status: COMPLETED | OUTPATIENT
Start: 2022-03-03 | End: 2022-03-03

## 2022-03-03 RX ADMIN — LIDOCAINE HYDROCHLORIDE 2 ML: 10 INJECTION, SOLUTION EPIDURAL; INFILTRATION; INTRACAUDAL; PERINEURAL at 11:24

## 2022-03-03 RX ADMIN — IOHEXOL 1 ML: 300 INJECTION, SOLUTION INTRAVENOUS at 11:25

## 2022-03-03 RX ADMIN — Medication 3 ML: at 11:26

## 2022-03-03 RX ADMIN — METHYLPREDNISOLONE ACETATE 80 MG: 80 INJECTION, SUSPENSION INTRA-ARTICULAR; INTRALESIONAL; INTRAMUSCULAR; PARENTERAL; SOFT TISSUE at 11:26

## 2022-03-03 NOTE — H&P
History of Present Illness:  The patient is a 46 y o  female who presents with complaints of low back and buttock pain  Patient Active Problem List   Diagnosis    Bruising, spontaneous   [address] maintenance    BMI 29 0-29 9,adult    Other specified hypothyroidism    Frequent nosebleeds    Encounter for routine gynecological examination with Papanicolaou smear of cervix    H  pylori infection    Vaginal dysplasia    Pain in left lumbar region of back       Past Medical History:   Diagnosis Date    Disease of thyroid gland     Osteopenia        Past Surgical History:   Procedure Laterality Date    CERVICAL BIOPSY  W/ LOOP ELECTRODE EXCISION N/A      SECTION N/A     LEFT OOPHORECTOMY Left     TOTAL ABDOMINAL HYSTERECTOMY W/ BILATERAL SALPINGOOPHORECTOMY Right          Current Outpatient Medications:     cholecalciferol (VITAMIN D3) 1,000 units tablet, Take 1 tablet (1,000 Units total) by mouth daily, Disp: 30 tablet, Rfl: 5    cyclobenzaprine (FLEXERIL) 10 mg tablet, Take 1 tablet (10 mg total) by mouth 2 (two) times a day as needed for muscle spasms (Patient not taking: Reported on 2022 ), Disp: 20 tablet, Rfl: 0    cyclobenzaprine (FLEXERIL) 10 mg tablet, Take 1 tablet (10 mg total) by mouth 3 (three) times a day as needed for muscle spasms (Patient not taking: Reported on 2022 ), Disp: 30 tablet, Rfl: 0    ergocalciferol (VITAMIN D2) 50,000 units, Take 1 capsule (50,000 Units total) by mouth once a week, Disp: 8 capsule, Rfl: 0    levothyroxine 50 mcg tablet, Take 1 tablet (50 mcg total) by mouth daily in the early morning, Disp: 90 tablet, Rfl: 3    lidocaine (Lidoderm) 5 %, Apply 1 patch topically daily Remove & Discard patch within 12 hours or as directed by MD, Disp: 6 patch, Rfl: 0    loperamide (IMODIUM) 2 mg capsule, Take 1 capsule (2 mg total) by mouth 4 (four) times a day as needed for diarrhea (Patient not taking: Reported on 2022 ), Disp: 30 capsule, Rfl: 0    meloxicam (Mobic) 15 mg tablet, Take 1 tablet (15 mg total) by mouth daily, Disp: 30 tablet, Rfl: 1    miconazole (MONISTAT-7) 2 % vaginal cream, Insert 1 applicator into the vagina daily at bedtime (Patient not taking: Reported on 2/11/2022 ), Disp: 45 g, Rfl: 0    Multiple Vitamin (multivitamin) tablet, Take 1 tablet by mouth daily, Disp: 100 tablet, Rfl: 3    oxymetazoline (AFRIN) 0 05 % nasal spray, 2 sprays by Each Nare route 2 (two) times a day, Disp: 30 mL, Rfl: 0    pantoprazole (PROTONIX) 20 mg tablet, Take 2 tablets (40 mg total) by mouth daily, Disp: 30 tablet, Rfl: 2    Current Facility-Administered Medications:     bupivacaine (PF) (MARCAINE) 0 25 % injection 10 mL, 10 mL, Intra-articular, Once, Mary Ohms, DO    iohexol (OMNIPAQUE) 300 mg/mL injection 50 mL, 50 mL, Intra-articular, Once, Mary Ohms, DO    lidocaine (PF) (XYLOCAINE-MPF) 1 % injection 5 mL, 5 mL, Infiltration, Once, Mary Ohms, DO    methylPREDNISolone acetate (DEPO-MEDROL) injection 80 mg, 80 mg, Intra-articular, Once, Mary Ohms, DO    No Known Allergies    Physical Exam: There were no vitals filed for this visit  General: Awake, Alert, Oriented x 3, Mood and affect appropriate  Respiratory: Respirations even and unlabored  Cardiovascular: Peripheral pulses intact; no edema  Musculoskeletal Exam:  Tenderness to palpation left-sided glutes    ASA Score: 2         Assessment:   1  Greater trochanteric bursitis of left hip    2  Piriformis syndrome of left side    3  Myofascial pain syndrome    4   Acute bilateral low back pain with left-sided sciatica        Plan: Left piriformis inj

## 2022-03-03 NOTE — DISCHARGE INSTRUCTIONS
1  Do not apply heat to any area that is numb  If you have discomfort or soreness at the injection site, you may apply ice today, 20 minutes on and 20 minutes off  Tomorrow you may use ice or warm, moist heat  Do not apply ice or heat directly to the skin  2  If you experience severe shortness of breath, go to the Emergency Room  3  You may have numbness for several hours from the local anesthetic  Please use caution and common sense, especially with weight-bearing activities  4  You may have an increase or change in the discomfort for 36-48 hours after your treatment  Apply ice and continue with any pain medicine you have been prescribed  5  Do not do anything strenuous today  You may shower, but no tub baths or hot tubs today  You may resume your normal activities tomorrow, but do not overdo it  Resume normal activities slowly when you are feeling better  6  If you experience redness, drainage or swelling at the injection site, or if you develop a fever above 100 degrees, please call [facility] at [phone] or go to the Emergency Room  7  Continue to take all routine medicines prescribed by your primary care physician unless otherwise instructed by our staff  Most blood thinners should be started again according to your regularly scheduled dosing  If you have any questions, please give our office a call  As no general anesthesia was used in today's procedure, you should not experience any side effects related to anesthesia  If you have a problem specifically related to your procedure, please call our office at [phone]  Problems not related to your procedure should be directed to your primary care physician  INSTRUCCIONES DEL PATO POSTERIOR AL PROCEDIMIENTO    1  No aplique calor en ninguna área que esté entumecida  Si siente molestias o dolor en el lugar de la inyección, por hoy puede colocar hielo adam 20 minutos y retirarlo adam otros 20 minutos   A partir de mañana, podrá utilizar hielo o calor húmedo  No aplique hielo o calor directo sobre la piel  2  Si sufre more dificultad respiratoria grave, acuda a la anaya de urgencias  3  Puede sentir entumecimiento adam varias horas debido a la anestesia local  Sea precavido y use el sentido común, en especial con las actividades que implican la carga de [place]  4  Puede experimentar un aumento o cambio en el malestar adam las 36-48 horas posteriores al tratamiento  Aplique hielo y continúe tomando cualquier analgésico que le hayan recetado  5  No realice ninguna actividad extenuante, por hoy  Puede ducharse, jin no se bañe en tinas ni en jacuzzis por hoy  Puede retomar marta actividades normales mañana, jin "no se exceda"  Retome marta actividades normales gradualmente a medida que se sienta mejor  6  Si nota enrojecimiento, secreción o inflamación en el sitio donde lo inyectaron, o si presenta fiebre superior a 100 grados, llame a [facility] al [phone] o acuda a la anaya de Juliana  7  Continúe tomando todos los medicamentos de rutina que le haya recetado nance médico de cabecera, a menos que nuestro personal le indique lo contrario  Natalie Shank a blank la mayoría de los anticoagulantes de acuerdo con la dosis que tiene programada regularmente  Si tiene [employer], llame a [employer]      8  El procedimiento que se realizó [facility]

## 2022-03-07 ENCOUNTER — OFFICE VISIT (OUTPATIENT)
Dept: PHYSICAL THERAPY | Facility: CLINIC | Age: 42
End: 2022-03-07
Payer: COMMERCIAL

## 2022-03-07 DIAGNOSIS — G57.02 PIRIFORMIS SYNDROME OF LEFT SIDE: ICD-10-CM

## 2022-03-07 DIAGNOSIS — M70.62 GREATER TROCHANTERIC BURSITIS OF LEFT HIP: Primary | ICD-10-CM

## 2022-03-07 DIAGNOSIS — M54.42 ACUTE BILATERAL LOW BACK PAIN WITH LEFT-SIDED SCIATICA: ICD-10-CM

## 2022-03-07 DIAGNOSIS — M79.18 MYOFASCIAL PAIN SYNDROME: ICD-10-CM

## 2022-03-07 PROCEDURE — 97112 NEUROMUSCULAR REEDUCATION: CPT

## 2022-03-07 PROCEDURE — 97110 THERAPEUTIC EXERCISES: CPT

## 2022-03-07 NOTE — PROGRESS NOTES
Daily Note     Today's date: 3/7/2022  Patient name: Darin Albert  : 1980  MRN: [id number]  Referring provider: Ramirez Dia DO  Dx:   Encounter Diagnosis     ICD-10-CM    1  Greater trochanteric bursitis of left hip  M70 62    2  Piriformis syndrome of left side  G57 02    3  Myofascial pain syndrome  M79 18    4  Acute bilateral low back pain with left-sided sciatica  M54 42        Start Time: 0755  Stop Time: 0840  Total time in clinic (min): 45 minutes    Subjective: Pt reports she is having some pain in her neck and LB today  Pt states she is fatigued with today's exercises  Objective: See treatment diary below      Assessment: Tolerated treatment well  Patient demonstrated fatigue post treatment and would benefit from continued PT  Pt performed exercises as noted with no signs of increased pain or adverse symptoms  Pt needed minimal VC/TCing for proper form and technique with TVA and clamshells  Next session incorporate held exercises secondary to time  Continue to progress as able  Plan: Continue per plan of care        Precautions: Yi Speaking     Date 2/28 3/7           Visit # 1 2           FOTO 53/65             Re-eval IE              Manuals 2/28 3/7           Paraspinals STM                                                    Neuro Re-Ed  3/7           TA Bracing 3x10" 10x10"           Supine Marches HEP 20x           Bridges HEP 15x5"           Clams  15x           Sciatic Nerve Glides  15x           Bird Dogs  20x           No Money             Ther Ex  3/7           Bike  8'           Rows/Ext             REIL HEP            SLR HEP            S/l hip ABD             UTR             QL str             [employer]             Ther Activity  3/7           Squat  20x                        Gait Training  3/7                                     Modalities  3/7           IFC 10'

## 2022-03-10 ENCOUNTER — HOSPITAL ENCOUNTER (EMERGENCY)
Facility: HOSPITAL | Age: 42
Discharge: HOME | End: 2022-03-10
Attending: EMERGENCY MEDICINE
Payer: COMMERCIAL

## 2022-03-10 ENCOUNTER — TELEPHONE (OUTPATIENT)
Dept: FAMILY MEDICINE CLINIC | Facility: CLINIC | Age: 42
End: 2022-03-10

## 2022-03-10 ENCOUNTER — TELEPHONE (OUTPATIENT)
Dept: PAIN MEDICINE | Facility: CLINIC | Age: 42
End: 2022-03-10

## 2022-03-10 VITALS
SYSTOLIC BLOOD PRESSURE: 132 MMHG | WEIGHT: 196.3 LBS | BODY MASS INDEX: 30.74 KG/M2 | RESPIRATION RATE: 18 BRPM | TEMPERATURE: 98.2 F | HEART RATE: 97 BPM | OXYGEN SATURATION: 97 % | DIASTOLIC BLOOD PRESSURE: 77 MMHG

## 2022-03-10 DIAGNOSIS — M62.838 MUSCLE SPASM: ICD-10-CM

## 2022-03-10 DIAGNOSIS — G89.29 ACUTE EXACERBATION OF CHRONIC LOW BACK PAIN: Primary | ICD-10-CM

## 2022-03-10 DIAGNOSIS — M54.50 ACUTE EXACERBATION OF CHRONIC LOW BACK PAIN: Primary | ICD-10-CM

## 2022-03-10 PROCEDURE — 99284 EMERGENCY DEPT VISIT MOD MDM: CPT

## 2022-03-10 PROCEDURE — 99283 EMERGENCY DEPT VISIT LOW MDM: CPT

## 2022-03-10 RX ORDER — METHOCARBAMOL 500 MG/1
500 TABLET, FILM COATED ORAL ONCE
Status: COMPLETED | OUTPATIENT
Start: 2022-03-10 | End: 2022-03-10

## 2022-03-10 RX ORDER — METHOCARBAMOL 500 MG/1
500 TABLET, FILM COATED ORAL 2 TIMES DAILY
Qty: 20 TABLET | Refills: 0 | Status: SHIPPED | OUTPATIENT
Start: 2022-03-10 | End: 2022-04-13 | Stop reason: SDUPTHER

## 2022-03-10 RX ORDER — LIDOCAINE 50 MG/G
2 PATCH TOPICAL ONCE
Status: DISCONTINUED | OUTPATIENT
Start: 2022-03-10 | End: 2022-03-10 | Stop reason: HOSPADM

## 2022-03-10 RX ORDER — IBUPROFEN 600 MG/1
600 TABLET ORAL ONCE
Status: COMPLETED | OUTPATIENT
Start: 2022-03-10 | End: 2022-03-10

## 2022-03-10 RX ADMIN — METHOCARBAMOL TABLETS 500 MG: 500 TABLET, COATED ORAL at 19:03

## 2022-03-10 RX ADMIN — LIDOCAINE 2 PATCH: 50 PATCH TOPICAL at 19:03

## 2022-03-10 RX ADMIN — IBUPROFEN 600 MG: 600 TABLET ORAL at 19:04

## 2022-03-10 NOTE — TELEPHONE ENCOUNTER
Pt left a voicemail for make appt for back pain, I called back the pt and make the appt for 03/24/22

## 2022-03-10 NOTE — TELEPHONE ENCOUNTER
Pt reports 10% improvement post inj   Pain level 4-5/10  She said she has a very strong feeling of tiredness in her back and she wants to know what you can do to help her with it    Pt aware I will call next week for an update

## 2022-03-11 NOTE — ED PROVIDER NOTES
History  Chief Complaint   Patient presents with    Back Pain     Upper back pain for 3 weeks  Took ibuprofen for pain at home with no releif  Denies loss of bowel or bladder  Denies lifting any heavy objects recently  Patient is a 81-year-old female coming in for complaint of right trapezius back pain x3 weeks  Patient states that it does not radiate anywhere, no midline tenderness, no loss of bladder, no urinary retention, no trouble defecating, no numbness or paresthesia  Patient does states that she has a history of sciatica, this feels different  Patient states that it sometimes hurts with movement, and naproxen does help a little bit comes back  Patient is in no acute distress at this time, has no chest pain or trouble breathing      History provided by:  Patient   used: No    Back Pain  Location:  Thoracic spine  Radiates to:  R shoulder  Pain severity:  Mild  Onset quality:  Gradual  Duration:  3 weeks  Timing:  Constant  Chronicity:  Chronic  Associated symptoms: no abdominal pain, no bladder incontinence, no bowel incontinence, no chest pain, no dysuria, no fever, no leg pain, no numbness, no paresthesias, no perianal numbness, no tingling, no weakness and no weight loss        Prior to Admission Medications   Prescriptions Last Dose Informant Patient Reported? Taking?    Multiple Vitamin (multivitamin) tablet  Self No Yes   Sig: Take 1 tablet by mouth daily   cholecalciferol (VITAMIN D3) 1,000 units tablet  Self No Yes   Sig: Take 1 tablet (1,000 Units total) by mouth daily   cyclobenzaprine (FLEXERIL) 10 mg tablet Not Taking at Unknown time Self No No   Sig: Take 1 tablet (10 mg total) by mouth 2 (two) times a day as needed for muscle spasms   Patient not taking: Reported on 2/11/2022    cyclobenzaprine (FLEXERIL) 10 mg tablet Not Taking at Unknown time  No No   Sig: Take 1 tablet (10 mg total) by mouth 3 (three) times a day as needed for muscle spasms   Patient not taking: Reported on 2022    ergocalciferol (VITAMIN D2) 50,000 units  Self No Yes   Sig: Take 1 capsule (50,000 Units total) by mouth once a week   levothyroxine 50 mcg tablet  Self No Yes   Sig: Take 1 tablet (50 mcg total) by mouth daily in the early morning   lidocaine (Lidoderm) 5 %  Self No Yes   Sig: Apply 1 patch topically daily Remove & Discard patch within 12 hours or as directed by MD   loperamide (IMODIUM) 2 mg capsule Not Taking at Unknown time Self No No   Sig: Take 1 capsule (2 mg total) by mouth 4 (four) times a day as needed for diarrhea   Patient not taking: Reported on 2022    meloxicam (Mobic) 15 mg tablet   No Yes   Sig: Take 1 tablet (15 mg total) by mouth daily   miconazole (MONISTAT-7) 2 % vaginal cream Not Taking at Unknown time Self No No   Sig: Insert 1 applicator into the vagina daily at bedtime   Patient not taking: Reported on 2022    oxymetazoline (AFRIN) 0 05 % nasal spray  Self No Yes   Si sprays by Each Nare route 2 (two) times a day   pantoprazole (PROTONIX) 20 mg tablet  Self No Yes   Sig: Take 2 tablets (40 mg total) by mouth daily   traMADol (ULTRAM) 50 mg tablet   Yes Yes   Sig: TAKE 1 TABLET BY MOUTH EVERY 6 HOURS AS NEEDED FOR MODERATE PAIN FOR UP TO 5 DAYS      Facility-Administered Medications: None       Past Medical History:   Diagnosis Date    Disease of thyroid gland     Osteopenia        Past Surgical History:   Procedure Laterality Date    CERVICAL BIOPSY  W/ LOOP ELECTRODE EXCISION N/A      SECTION N/A     LEFT OOPHORECTOMY Left     TOTAL ABDOMINAL HYSTERECTOMY W/ BILATERAL SALPINGOOPHORECTOMY Right        History reviewed  No pertinent family history  I have reviewed and agree with the history as documented      E-Cigarette/Vaping    E-Cigarette Use Never User      E-Cigarette/Vaping Substances    Nicotine No      Social History     Tobacco Use    Smoking status: Never Smoker    Smokeless tobacco: Never Used   Vaping Use    Vaping Use: Never used   Substance Use Topics    Alcohol use: Yes    Drug use: Never       Review of Systems   Constitutional: Negative  Negative for activity change, appetite change, chills, fever and weight loss  HENT: Negative  Eyes: Negative  Respiratory: Negative  Negative for chest tightness and shortness of breath  Cardiovascular: Negative  Negative for chest pain  Gastrointestinal: Negative  Negative for abdominal pain, bowel incontinence, nausea and vomiting  Genitourinary: Negative  Negative for bladder incontinence, difficulty urinating and dysuria  Musculoskeletal: Positive for back pain  Negative for gait problem and joint swelling  Neurological: Negative  Negative for tingling, weakness, numbness and paresthesias  Psychiatric/Behavioral: Negative  Physical Exam  Physical Exam  Vitals reviewed  Constitutional:       Appearance: Normal appearance  She is obese  HENT:      Head: Normocephalic and atraumatic  Right Ear: External ear normal       Left Ear: External ear normal       Nose: Nose normal    Eyes:      General:         Right eye: No discharge  Left eye: No discharge  Conjunctiva/sclera: Conjunctivae normal    Cardiovascular:      Rate and Rhythm: Normal rate  Pulses: Normal pulses  Pulmonary:      Effort: Pulmonary effort is normal    Musculoskeletal:         General: No swelling, tenderness, deformity or signs of injury  Normal range of motion  Cervical back: Normal range of motion  Skin:     General: Skin is warm  Neurological:      Mental Status: She is alert           Vital Signs  ED Triage Vitals [03/10/22 1828]   Temperature Pulse Respirations Blood Pressure SpO2   98 2 °F (36 8 °C) 97 18 132/77 97 %      Temp Source Heart Rate Source Patient Position - Orthostatic VS BP Location FiO2 (%)   Oral Monitor Sitting Right arm --      Pain Score       --           Vitals:    03/10/22 1828   BP: 132/77   Pulse: 97   Patient Position - Orthostatic VS: Sitting         Visual Acuity      ED Medications  Medications   lidocaine (LIDODERM) 5 % patch 2 patch (2 patches Topical Medication Applied 3/10/22 1903)   methocarbamol (ROBAXIN) tablet 500 mg (500 mg Oral Given 3/10/22 1903)   ibuprofen (MOTRIN) tablet 600 mg (600 mg Oral Given 3/10/22 1904)       Diagnostic Studies  Results Reviewed     None                 No orders to display              Procedures  Procedures         ED Course                               SBIRT 22yo+      Most Recent Value   SBIRT (22 yo +)    In order to provide better care to our patients, we are screening all of our patients for alcohol and drug use  Would it be okay to ask you these screening questions? No Filed at: 03/10/2022 1839                    MDM  Number of Diagnoses or Management Options  Acute exacerbation of chronic low back pain: new and does not require workup  Muscle spasm: new and does not require workup  Diagnosis management comments: Patient is 81-year-old female coming in for 3 weeks of back pain  Patient has no red flag symptoms on exam or interview  Patient was discharged home with ambulatory referral to comprehensive spine    Counseling: I had a detailed discussion with the patient and/or guardian regarding: the historical points, exam findings, and any diagnostic results supporting the discharge diagnosis, lab results, radiology results, discharge instructions reviewed with patient and/or family/caregiver and understanding was verbalized   Instructions given to return to the emergency department if symptoms worsen or persist, or if there are any questions or concerns that arise at home       All labs reviewed and utilized in the medical decision making process     All radiology studies independently viewed by me and interpreted by the radiologist     Portions of the record may have been created with voice recognition software   Occasional wrong word or "sound a like" substitutions may have occurred due to the inherent limitations of voice recognition software   Read the chart carefully and recognize, using context, where substitutions have occurred  Risk of Complications, Morbidity, and/or Mortality  Presenting problems: minimal  Diagnostic procedures: minimal  Management options: minimal    Patient Progress  Patient progress: stable      Disposition  Final diagnoses:   Acute exacerbation of chronic low back pain   Muscle spasm     Time reflects when diagnosis was documented in both MDM as applicable and the Disposition within this note     Time User Action Codes Description Comment    3/10/2022  6:46 PM Billie Reid Add [M54 50,  G89 29] Acute exacerbation of chronic low back pain     3/10/2022  6:47 PM Billie Reid Add [T00 811] Muscle spasm       ED Disposition     ED Disposition Condition Date/Time Comment    Discharge Stable Thu Mar 10, 2022  6:46 PM Brayan Sims discharge to home/self care              Follow-up Information     Follow up With Specialties Details Why Contact Info Additional Information    Diego Starr Nurse Practitioner   [facility] [facility]  [address] [place] [address]  [place] [facility]        [place] [address] Heart Emergency Department Emergency Medicine  As needed, If symptoms worsen [address] [id number] [address] [facility] Emergency Department    [facility] Physical Therapy   [phone]          Discharge Medication List as of 3/10/2022  6:47 PM      START taking these medications    Details   methocarbamol (ROBAXIN) 500 mg tablet Take 1 tablet (500 mg total) by mouth 2 (two) times a day, Starting Thu 3/10/2022, Normal         CONTINUE these medications which have NOT CHANGED    Details   cholecalciferol (VITAMIN D3) 1,000 units tablet Take 1 tablet (1,000 Units total) by mouth daily, Starting Mon 9/27/2021, Normal      ergocalciferol (VITAMIN D2) 50,000 units Take 1 capsule (50,000 Units total) by mouth once a week, Starting Mon 4/12/2021, Normal      levothyroxine 50 mcg tablet Take 1 tablet (50 mcg total) by mouth daily in the early morning, Starting Mon 9/27/2021, Normal      lidocaine (Lidoderm) 5 % Apply 1 patch topically daily Remove & Discard patch within 12 hours or as directed by MD, Starting Mon 10/11/2021, Normal      meloxicam (Mobic) 15 mg tablet Take 1 tablet (15 mg total) by mouth daily, Starting Fri 2/11/2022, Normal      Multiple Vitamin (multivitamin) tablet Take 1 tablet by mouth daily, Starting Fri 4/9/2021, Normal      oxymetazoline (AFRIN) 0 05 % nasal spray 2 sprays by Each Nare route 2 (two) times a day, Starting Fri 4/9/2021, Normal      pantoprazole (PROTONIX) 20 mg tablet Take 2 tablets (40 mg total) by mouth daily, Starting Mon 9/27/2021, Normal      traMADol (ULTRAM) 50 mg tablet TAKE 1 TABLET BY MOUTH EVERY 6 HOURS AS NEEDED FOR MODERATE PAIN FOR UP TO 5 DAYS, Historical Med      !! cyclobenzaprine (FLEXERIL) 10 mg tablet Take 1 tablet (10 mg total) by mouth 2 (two) times a day as needed for muscle spasms, Starting Mon 10/11/2021, Normal      !! cyclobenzaprine (FLEXERIL) 10 mg tablet Take 1 tablet (10 mg total) by mouth 3 (three) times a day as needed for muscle spasms, Starting Wed 12/1/2021, Normal      loperamide (IMODIUM) 2 mg capsule Take 1 capsule (2 mg total) by mouth 4 (four) times a day as needed for diarrhea, Starting Mon 8/16/2021, Normal      miconazole (MONISTAT-7) 2 % vaginal cream Insert 1 applicator into the vagina daily at bedtime, Starting Mon 8/16/2021, Normal       !! - Potential duplicate medications found  Please discuss with provider                PDMP Review     None          ED Provider  Electronically Signed by           Mehnaz Saab PA-C  03/10/22 1918

## 2022-03-15 ENCOUNTER — TELEPHONE (OUTPATIENT)
Dept: PHYSICAL THERAPY | Facility: OTHER | Age: 42
End: 2022-03-15

## 2022-03-15 NOTE — TELEPHONE ENCOUNTER
Message left for patient regarding referral entered for [facility]  Nurse requested patient CB if needed and leave Full Name &  on VM  One of the nurses will return the call to discuss the program further      Referral deferred for f/u    Note: Message left with assistance of [address] # [id number]    Per EMR patient established with [facility]- will discuss program at time of CB

## 2022-03-21 ENCOUNTER — APPOINTMENT (OUTPATIENT)
Dept: PHYSICAL THERAPY | Facility: CLINIC | Age: 42
End: 2022-03-21
Payer: COMMERCIAL

## 2022-03-23 ENCOUNTER — PROCEDURE VISIT (OUTPATIENT)
Dept: PAIN MEDICINE | Facility: CLINIC | Age: 42
End: 2022-03-23
Payer: COMMERCIAL

## 2022-03-23 DIAGNOSIS — M70.61 GREATER TROCHANTERIC BURSITIS OF RIGHT HIP: Primary | ICD-10-CM

## 2022-03-23 DIAGNOSIS — M70.62 GREATER TROCHANTERIC BURSITIS OF LEFT HIP: ICD-10-CM

## 2022-03-23 PROCEDURE — 20611 DRAIN/INJ JOINT/BURSA W/US: CPT | Performed by: PHYSICAL MEDICINE & REHABILITATION

## 2022-03-23 RX ORDER — BUPIVACAINE HYDROCHLORIDE 5 MG/ML
10 INJECTION, SOLUTION PERINEURAL ONCE
Status: COMPLETED | OUTPATIENT
Start: 2022-03-23 | End: 2022-03-23

## 2022-03-23 RX ORDER — METHYLPREDNISOLONE ACETATE 40 MG/ML
40 INJECTION, SUSPENSION INTRA-ARTICULAR; INTRALESIONAL; INTRAMUSCULAR; SOFT TISSUE ONCE
Status: COMPLETED | OUTPATIENT
Start: 2022-03-23 | End: 2022-03-23

## 2022-03-23 RX ADMIN — METHYLPREDNISOLONE ACETATE 40 MG: 40 INJECTION, SUSPENSION INTRA-ARTICULAR; INTRALESIONAL; INTRAMUSCULAR; SOFT TISSUE at 13:53

## 2022-03-23 RX ADMIN — BUPIVACAINE HYDROCHLORIDE 10 ML: 5 INJECTION, SOLUTION PERINEURAL at 13:52

## 2022-03-23 NOTE — PROGRESS NOTES
Indication:  Lateral hip pain  Preprocedure diagnosis:  1  Trochanteric bursitis                                                   2   Lateral hip pain  Postprocedure diagnosis:  1  Trochanteric bursitis                                                   2   Lateral hip pain    Procedure: Ultrasound-guided left trochanteric bursa injection(s)    After discussing the risks, benefits, and alternatives to the procedure, the patient expressed understanding and wished to proceed  The patient was brought to the procedure suite and placed in the sidelying position  A procedural pause was conducted to verify:  correct patient identity, procedure to be performed and as applicable, correct side and site, correct patient position, and availability of implants, special equipment or special requirements  A simple surgical tray was used  Prior to the procedure, the lateral hip was examined with a 3 75 MHz curvilinear transducer to visualize the greater trochanter, tendons, and bursa and to determine the optimal needle path  Following this, the lateral hip was prepared with a ChloraPrep scrub, then re-examined using the same transducer, a sterile ultrasound transducer cover, and sterile ultrasound transducer gel  Thereafter, using continuous ultrasound guidance, a 2 5 in 25 gauge needle was advanced into the peritrochanteric bursa region  After visualization of the tip in the target area and negative aspiration for blood, a mixture of 40 mg Depo-Medrol in 3 mL of 0 25% bupivacaine was injected into the trochanteric bursa  Following the injection, the needle was withdrawn  The patient tolerated the procedure well and there were no apparent complications  After an appropriate amount of observation, the patient was dismissed from the clinic in good condition under their own power

## 2022-03-28 ENCOUNTER — APPOINTMENT (OUTPATIENT)
Dept: PHYSICAL THERAPY | Facility: CLINIC | Age: 42
End: 2022-03-28
Payer: COMMERCIAL

## 2022-03-30 ENCOUNTER — TELEPHONE (OUTPATIENT)
Dept: PAIN MEDICINE | Facility: CLINIC | Age: 42
End: 2022-03-30

## 2022-04-04 ENCOUNTER — PROCEDURE VISIT (OUTPATIENT)
Dept: PAIN MEDICINE | Facility: CLINIC | Age: 42
End: 2022-04-04
Payer: COMMERCIAL

## 2022-04-04 DIAGNOSIS — M70.61 GREATER TROCHANTERIC BURSITIS OF RIGHT HIP: Primary | ICD-10-CM

## 2022-04-04 PROCEDURE — 20611 DRAIN/INJ JOINT/BURSA W/US: CPT | Performed by: PHYSICAL MEDICINE & REHABILITATION

## 2022-04-04 RX ORDER — METHYLPREDNISOLONE ACETATE 40 MG/ML
40 INJECTION, SUSPENSION INTRA-ARTICULAR; INTRALESIONAL; INTRAMUSCULAR; SOFT TISSUE ONCE
Status: COMPLETED | OUTPATIENT
Start: 2022-04-04 | End: 2022-04-04

## 2022-04-04 RX ORDER — BUPIVACAINE HYDROCHLORIDE 5 MG/ML
10 INJECTION, SOLUTION PERINEURAL ONCE
Status: COMPLETED | OUTPATIENT
Start: 2022-04-04 | End: 2022-04-04

## 2022-04-04 RX ADMIN — METHYLPREDNISOLONE ACETATE 40 MG: 40 INJECTION, SUSPENSION INTRA-ARTICULAR; INTRALESIONAL; INTRAMUSCULAR; SOFT TISSUE at 14:06

## 2022-04-04 RX ADMIN — BUPIVACAINE HYDROCHLORIDE 10 ML: 5 INJECTION, SOLUTION PERINEURAL at 14:06

## 2022-04-05 ENCOUNTER — TELEPHONE (OUTPATIENT)
Dept: PAIN MEDICINE | Facility: CLINIC | Age: 42
End: 2022-04-05

## 2022-04-05 ENCOUNTER — TELEPHONE (OUTPATIENT)
Dept: OBGYN CLINIC | Facility: CLINIC | Age: 42
End: 2022-04-05

## 2022-04-05 NOTE — TELEPHONE ENCOUNTER
S/p USG right trochanteric bursa injection yesterday    S/W pt using Indonesian interrupter [id number]  Pt stated this is the 3rd time she had the injection and she has pain in her right leg that doesn't go away  Pt asking if it is normal?  Advised pt you may have an increase or change in the discomfort for 36-48 hours after your treatment  Apply ice and continue with any pain medication you have been prescribed  Do not do anything strenuous the day of the procedure  You may can resume your normal activities today but do not over go it  Resume normal activities slowly when you are feeling better  Pt stated she is allowed to take tylenol and can take up to 3,000 mg in 24 hrs  Pt stated it only hurts when she is walking  Pt denies fevers, denies S&S of infection  Pt verbalized understanding

## 2022-04-05 NOTE — TELEPHONE ENCOUNTER
[place]banian SPEAKING PT    Patient has her USGI yesterday & states she is currently experiencing lower extremity pain   She would like to speak to a nurse if this is normal  Please reach out to her at # [phone]

## 2022-04-08 ENCOUNTER — TELEPHONE (OUTPATIENT)
Dept: PHYSICAL THERAPY | Facility: OTHER | Age: 42
End: 2022-04-08

## 2022-04-08 NOTE — TELEPHONE ENCOUNTER
Nurse reached out to discuss recent referral entered for  Comprehensive Sp[facility] program and offerings  Nurse reviewed reason for call with assistance of ADIS JOHNSON # [id number]    Patient declined to participate in the program at this time as she has already begun treatment plan  Patient seen by PT and PM for several dx  Patient stated she is feeling much better now  Nurse confirmed patient has [facility] contact information and encouraged to call program back if needed in the future  Patient agreed and very appreciative of call and discussion  Nurse wished her well and referral closed per protocol

## 2022-04-13 ENCOUNTER — OFFICE VISIT (OUTPATIENT)
Dept: FAMILY MEDICINE CLINIC | Facility: CLINIC | Age: 42
End: 2022-04-13

## 2022-04-13 ENCOUNTER — APPOINTMENT (OUTPATIENT)
Dept: LAB | Facility: CLINIC | Age: 42
End: 2022-04-13
Payer: COMMERCIAL

## 2022-04-13 VITALS
HEART RATE: 79 BPM | SYSTOLIC BLOOD PRESSURE: 128 MMHG | BODY MASS INDEX: 30.45 KG/M2 | DIASTOLIC BLOOD PRESSURE: 74 MMHG | OXYGEN SATURATION: 98 % | TEMPERATURE: 97.8 F | WEIGHT: 194 LBS | RESPIRATION RATE: 18 BRPM | HEIGHT: 67 IN

## 2022-04-13 DIAGNOSIS — E03.8 OTHER SPECIFIED HYPOTHYROIDISM: ICD-10-CM

## 2022-04-13 DIAGNOSIS — K59.01 SLOW TRANSIT CONSTIPATION: ICD-10-CM

## 2022-04-13 DIAGNOSIS — E55.9 VITAMIN D DEFICIENCY: ICD-10-CM

## 2022-04-13 DIAGNOSIS — K21.9 GASTROESOPHAGEAL REFLUX DISEASE WITHOUT ESOPHAGITIS: ICD-10-CM

## 2022-04-13 DIAGNOSIS — M62.838 MUSCLE SPASM: ICD-10-CM

## 2022-04-13 DIAGNOSIS — A04.8 H. PYLORI INFECTION: ICD-10-CM

## 2022-04-13 DIAGNOSIS — M70.62 GREATER TROCHANTERIC BURSITIS OF LEFT HIP: ICD-10-CM

## 2022-04-13 DIAGNOSIS — G57.02 PIRIFORMIS SYNDROME OF LEFT SIDE: ICD-10-CM

## 2022-04-13 DIAGNOSIS — M79.18 MYOFASCIAL PAIN SYNDROME: ICD-10-CM

## 2022-04-13 DIAGNOSIS — M54.42 ACUTE BILATERAL LOW BACK PAIN WITH LEFT-SIDED SCIATICA: ICD-10-CM

## 2022-04-13 LAB
25(OH)D3 SERPL-MCNC: 21.4 NG/ML (ref 30–100)
ANION GAP SERPL CALCULATED.3IONS-SCNC: 3 MMOL/L (ref 4–13)
BASOPHILS # BLD AUTO: 0.02 THOUSANDS/ΜL (ref 0–0.1)
BASOPHILS NFR BLD AUTO: 0 % (ref 0–1)
BUN SERPL-MCNC: 12 MG/DL (ref 5–25)
CALCIUM SERPL-MCNC: 9.3 MG/DL (ref 8.3–10.1)
CHLORIDE SERPL-SCNC: 103 MMOL/L (ref 100–108)
CHOLEST SERPL-MCNC: 226 MG/DL
CO2 SERPL-SCNC: 31 MMOL/L (ref 21–32)
CREAT SERPL-MCNC: 0.76 MG/DL (ref 0.6–1.3)
EOSINOPHIL # BLD AUTO: 0.08 THOUSAND/ΜL (ref 0–0.61)
EOSINOPHIL NFR BLD AUTO: 1 % (ref 0–6)
ERYTHROCYTE [DISTWIDTH] IN BLOOD BY AUTOMATED COUNT: 13.5 % (ref 11.6–15.1)
GFR SERPL CREATININE-BSD FRML MDRD: 99 ML/MIN/1.73SQ M
GLUCOSE P FAST SERPL-MCNC: 86 MG/DL (ref 65–99)
HCT VFR BLD AUTO: 40.7 % (ref 34.8–46.1)
HDLC SERPL-MCNC: 89 MG/DL
HGB BLD-MCNC: 12.6 G/DL (ref 11.5–15.4)
IMM GRANULOCYTES # BLD AUTO: 0.02 THOUSAND/UL (ref 0–0.2)
IMM GRANULOCYTES NFR BLD AUTO: 0 % (ref 0–2)
LDLC SERPL CALC-MCNC: 116 MG/DL (ref 0–100)
LYMPHOCYTES # BLD AUTO: 1.79 THOUSANDS/ΜL (ref 0.6–4.47)
LYMPHOCYTES NFR BLD AUTO: 30 % (ref 14–44)
MCH RBC QN AUTO: 27.7 PG (ref 26.8–34.3)
MCHC RBC AUTO-ENTMCNC: 31 G/DL (ref 31.4–37.4)
MCV RBC AUTO: 90 FL (ref 82–98)
MONOCYTES # BLD AUTO: 0.42 THOUSAND/ΜL (ref 0.17–1.22)
MONOCYTES NFR BLD AUTO: 7 % (ref 4–12)
NEUTROPHILS # BLD AUTO: 3.66 THOUSANDS/ΜL (ref 1.85–7.62)
NEUTS SEG NFR BLD AUTO: 62 % (ref 43–75)
NONHDLC SERPL-MCNC: 137 MG/DL
NRBC BLD AUTO-RTO: 0 /100 WBCS
PLATELET # BLD AUTO: 266 THOUSANDS/UL (ref 149–390)
PMV BLD AUTO: 9.9 FL (ref 8.9–12.7)
POTASSIUM SERPL-SCNC: 3.9 MMOL/L (ref 3.5–5.3)
RBC # BLD AUTO: 4.55 MILLION/UL (ref 3.81–5.12)
SODIUM SERPL-SCNC: 137 MMOL/L (ref 136–145)
TRIGL SERPL-MCNC: 103 MG/DL
TSH SERPL DL<=0.05 MIU/L-ACNC: 4.06 UIU/ML (ref 0.45–4.5)
WBC # BLD AUTO: 5.99 THOUSAND/UL (ref 4.31–10.16)

## 2022-04-13 PROCEDURE — 80048 BASIC METABOLIC PNL TOTAL CA: CPT

## 2022-04-13 PROCEDURE — 82306 VITAMIN D 25 HYDROXY: CPT

## 2022-04-13 PROCEDURE — 85025 COMPLETE CBC W/AUTO DIFF WBC: CPT

## 2022-04-13 PROCEDURE — 84443 ASSAY THYROID STIM HORMONE: CPT

## 2022-04-13 PROCEDURE — 80061 LIPID PANEL: CPT

## 2022-04-13 PROCEDURE — 99214 OFFICE O/P EST MOD 30 MIN: CPT | Performed by: NURSE PRACTITIONER

## 2022-04-13 PROCEDURE — 36415 COLL VENOUS BLD VENIPUNCTURE: CPT

## 2022-04-13 RX ORDER — PANTOPRAZOLE SODIUM 20 MG/1
40 TABLET, DELAYED RELEASE ORAL DAILY
Qty: 30 TABLET | Refills: 2 | Status: SHIPPED | OUTPATIENT
Start: 2022-04-13 | End: 2022-05-04 | Stop reason: SDUPTHER

## 2022-04-13 RX ORDER — METHOCARBAMOL 500 MG/1
500 TABLET, FILM COATED ORAL 2 TIMES DAILY
Qty: 20 TABLET | Refills: 0 | Status: SHIPPED | OUTPATIENT
Start: 2022-04-13

## 2022-04-13 RX ORDER — SENNOSIDES 8.6 MG
8.6 TABLET ORAL
Qty: 30 TABLET | Refills: 5 | Status: SHIPPED | OUTPATIENT
Start: 2022-04-13

## 2022-04-13 RX ORDER — MELOXICAM 15 MG/1
15 TABLET ORAL DAILY
Qty: 30 TABLET | Refills: 1 | Status: SHIPPED | OUTPATIENT
Start: 2022-04-13 | End: 2022-04-28 | Stop reason: SDUPTHER

## 2022-04-13 NOTE — ASSESSMENT & PLAN NOTE
Recheck TSH - she's been taking her mother's levothyroxine b/c she never picked up the supplies I sent to [facility] in September

## 2022-04-13 NOTE — PATIENT INSTRUCTIONS
Control del peso   LO QUE NECESITA SABER:   Tener sobrepeso aumenta nance riesgo de presentar problemas de vandana daniel enfermedades del corazón, hipertensión, diabetes tipo 2 y ciertos tipos de cáncer  También puede aumentar nance riesgo de presentar osteoartritis, apnea del sueño y otros problemas respiratorios  Trate de bajar de peso de forma gradual y [place]  Incluso more mínima pérdida de peso puede disminuir nance riesgo de problemas de [place]  INSTRUCCIONES SOBRE EL PATO HOSPITALARIA:   Cómo bajar de peso de [place] forma robin: Daune Brownie forma robin y saludable para perder peso es consumir menos calorías y realizar more actividad física en forma regular  · Usted puede perder hasta 1 beatriz por semana al reducir el consumo de 500 calorías cada día  Usted puede reducir el consumo de calorías al comer porciones más pequeñas o eliminar los alimentos con alto contenido de calorías  Pilar las etiquetas para determinar la cantidad de calorías que contienen los alimentos que consume  · También puede quemar calorías al realizar ejercicio daniel caminar, nadar o montar en bicicleta  Es más probable que usted [employer] peso si hace de estos cambios parte de nance estilo de erick  Realice more actividad física por lo menos 30 minutos al día, la mayoría de los días de la [place]  Usted también puede realizar más actividad física usando las escaleras en vez de los ascensores o estacionarse más lejos cuando [place] a las tiendas  Pregunte a annce médico acerca del mejor plan de ejercicio para usted  Plan de alimentación saludable para el control del peso: Un plan de alimentación saludable incluye more variedad de alimentos, contiene más pocas calorías y lo ayuda a estar saludable  Un plan de alimentación saludable incluye lo siguiente:     · Consuma alimentos de grano integral con más frecuencia  Un plan de alimentación saludable debe contener alimentos con fibra   La fibra es la parte de las frutas, verduras y granos que nance cuerpo no puede digerir  Los alimentos de granos integrales son saludables y suministran fibra adicional a nance Marvia Amble  Algunos ejemplos de alimentos de granos integrales son los panes integrales, pastas integrales, la paige, el arroz integral y flavio de bulgur  · Consuma more variedad de verduras todos los días  [address], coliflor, col noelle y [place]  Coma verduras anaranjadas daniel las zanahorias, paul dulces y calabaza de invierno  · Consuma more variedad de frutas todos los ronal  Escoja frutas frescas o enlatadas en nance propio jugo o con jugo bajo en [place] en vez de jugo  El [place] de frutas tiene [address] o lawanda nada de [place]  · Consuma productos lácteos con bajo contenido de [place]  [address] de 1%  Consuma yogur descremado y requesón (cottage) semidescremado  Trate de consumir quesos descremados daniel el queso mozzarela y otros quesos semidescremado  · Seleccione codey y otros alimentos con proteínas bajos en grasa  Escoja frijoles u [address]  Seleccione pescado, carne de aves sin piel (daniel el lanre o [place]), kimble de carne [place] (de res o de cerdo)  Antes de cocinar las codey o las aves live cualquier parte de grasa visible  · Use menos grasas y aceites  Trate de hornear los alimentos en lugar de freírlos  Trate de hornear los alimentos en lugar de freírlos  Consuma menos alimentos de alto tenor graso  Coma menos alimentos altos en grasa daniel las paul fritas, donas, helados y pasteles  · Consuma menos dulces  Limite los alimentos y las bebidas con un gran contenido de azúcar  Estos incluyen los caramelos, galletas, gaseosas normales y bebidas endulzadas  Formas de reducir las calorías:  · [address] porciones  ? Use platos pequeños para servirse porciones pequeñas  ? No coma segundas porciones      ? Cuando coma en un restaurante, pida more caja y guarde en maddie la mitad de la comida antes de empezar a comer  ? Comparta con alguien un plato de entrada  · Reemplace los bocadillos o meriendas altos en calorías por los saludables de menos calorías  ? Escoja frutas frescas, verduras, galletas de arroz bajo en grasa o palomitas de maíz en lugar de comer paul fritas de paquete, nueces o dulces de chocolate  ? [place] agua o bebidas dietéticas en lugar de las endulzadas  · No vaya al iglesias cuando tenga hambre  Usted podría ser más propenso a elegir alimentos no saludables  Lleve more lista de alimentos saludables y vaya de compras después de vimal comido  · Coma marta comidas regularmente  No se salte ninguna comida  No omita ninguna comida porque esto puede conducir a comer más a more hora más tarde del día  [place] podría traerle problemas para perder peso  Si no tiene tiempo para hacer comidas regulares, consuma un refrigerio saludable  Hable con un dietista para que lo ayude a crear un plan de comidas y un horario que tressa adecuados para usted  Otras cosas que debe tener en cuenta mientras trata de bajar de peso:  · Esté consciente de las situaciones que podrían ocasionarle ganas de comer en exceso, daniel el comer mientras berenice la televisión  Busque formas para evitar estas situaciones  Por ejemplo, leer un libro, caminar o hacer trabajos manuales  · Reúnase con un radha de apoyo o con personas que también están tratando de bajar de [place]  [place] le puede ayudar a mantenerse motivado y continuar progresando en nance objetivo de perder peso  © [employer] 2022 Information is for End User's use only and may not be sold, redistributed or otherwise used for commercial purposes  All illustrations and images included in CareNotes® are the copyrighted property of [employer]  or [address] [place] [address] es sólo para uso en educación  Nance intención no es darle un consejo médico sobre enfermedades o tratamientos   Colsulte con nance médico, enfermera o farmacéutico antes de seguir cualquier régimen médico para saber si es seguro y efectivo para usted  Constipação   O QUE VOCÊ PRECISA SABER:   Constipação significa fezes endurecidas e secas ou um maior tempo entre as evacuações  INSTRUÇÕES APÓS A PATO:   Ligue para o seu cuidador se:  · Houver sangue anna fezes  · Você estiver com febre e com herlinda abdominal devido à constipação  · A constipação ficar mais forte  · Você começa a vomitar  · Você tiver dúvidas ou preocupações quanto ao seu problema de saúde ou daniel lidar com sumeet  Medicamentos:  · Medicamentos daniel um laxante podem ajudar a relaxar e soltar os intestinos para ajudá-lo a ter vashti evacuação  O médico poderá recomendar que você use laxantes apenas por um curto período de tempo  O uso por períodos prolongados pode fazer com que o intestino foreign fique dependente do medicamento  · Lexa seu medicamento conforme orientado  Entre em contato com o seu médico se achar que o medicamento não está ajudando ou se você apresentar efeitos colaterais  Informe a sumeet se você for alérgico a algum medicamento  [place] vashti lista de todos os medicamentos, vitaminas e ervas (fitoterápicos) que você mary  Inclua a quantidade, quando e por que os [place]  Leve a lista ou os frascos de comprimidos às consultas de acompanhamento  Leve sua lista de medicamentos consigo em karen de emergência  Alívio da constipação:  · Um supositório pode ser usado para ajudar a suavizar a evacuação  Isso poderá ajudar a passagem  O supositório é colocado em seu reto pelo ânus  · Um enema é um medicamento líquido usado para limpar a evacuação do seu reto  O medicamento é colocado em seu reto pelo ânus  Evite a constipação:  · Erum líquidos conforme orientado  Talvez você precise beber mais líquido para ajudar a suavizar a evacuação  Pergunte quanto de líquido precisa por vic e quais líquidos são melhores para você  · Coma alimentos ricos em fibras   Isso pode ajudar a diminuir a constipação dando volume às fezes  Alimentos saudáveis incluem frutas, vegetais, cereais, pães integrais e feijões  Seu médico ou nutricionista podem ajudar você a elaborar vashti dieta mk em fibras  O médico também poderá recomendar um suplemento de fibras karen você não [place] obter vashti quantidade suficiente de fibras pela alimentação  · Faça exercícios regularmente  A atividade física regular pode estimular seus intestinos  Caminhar é um bom exercício para impedir ou aliviar constipação  Pergunte quais exercícios são melhores para você  · Programe um horário todos os vo para ir ao banheiro  isso pode ajudar seu corpo a evacuar regularmente  Curve-se para a frente quando estiver sentado no vaso sanitário para ajudar a eliminar as fezes  Fique pelo menos 10 minutos sentado no vaso sanitário, mesmo que você não esteja com vontade de evacuar  · [place] com o profissional de saúde sobre seus medicamentos  Alguns medicamentos podem causar constipação, daniel opioides  Seu médico poderá fazer DEANDRA Angel em seus medicamentos  Por exemplo, seu médico poderá alterar o tipo de medicamento ou a frequência de uso  Faça o acompanhamento com o médico conforme orientado: Anote as dúvidas que você tiver para se lembrar de perguntar sobre elas adam as consultas  © Copyright [employer] 2022 Information is for End User's use only and may not be sold, redistributed or otherwise used for commercial purposes  All illustrations and images included in CareNotes® are the copyrighted property of [employer]  or Kali Ortega  The above information is an  only  It is not intended as medical advice for individual conditions or treatments  Talk to your doctor, nurse or pharmacist before following any medical regimen to see if it is safe and effective for you

## 2022-04-13 NOTE — ASSESSMENT & PLAN NOTE
Cont mgt per Pain&Spine service  Refill on NSAID, advised use only prn  Declined comp spine program - states she went to PT enough

## 2022-04-13 NOTE — PROGRESS NOTES
Assessment/Plan:    Problem List Items Addressed This Visit        Digestive    Gastroesophageal reflux disease without esophagitis     Resolved h pylori, still uses PPI which helps  Refilled today  Discussed GERD diet  Relevant Medications    pantoprazole (PROTONIX) 20 mg tablet       Endocrine    Other specified hypothyroidism     Recheck TSH - she's been taking her mother's levothyroxine b/c she never picked up the supplies I sent to [facility] in September  Relevant Orders    TSH, 3rd generation with Free T4 reflex       Nervous and Auditory    Piriformis syndrome of left side     Cont mgt per Pain[facility]Spine service  Refill on NSAID, advised use only prn  Declined comp spine program - states she went to PT enough  Relevant Medications    meloxicam (Mobic) 15 mg tablet       Musculoskeletal and Integument    Myofascial pain syndrome    Relevant Medications    meloxicam (Mobic) 15 mg tablet       Other    BMI 30 0-30 9,adult - Primary     BMI above normal  Counseling and plan as documented below   -Pt acknowledges understanding and agrees to lifestyle changes   -Additional handouts on diet/exercise provided   -Will monitor progress at next scheduled follow-up  -refer to weight mgt         RESOLVED: H  pylori infection    Relevant Medications    pantoprazole (PROTONIX) 20 mg tablet    Vitamin D deficiency     Recheck D level, cont supplements for now  Discussed dietary sources           Relevant Orders    Vitamin D 25 hydroxy      Other Visit Diagnoses     Slow transit constipation        Relevant Medications    senna (SENOKOT) 8 6 mg    Muscle spasm        Relevant Medications    methocarbamol (ROBAXIN) 500 mg tablet    Greater trochanteric bursitis of left hip        Relevant Medications    meloxicam (Mobic) 15 mg tablet    Acute bilateral low back pain with left-sided sciatica        Relevant Medications    meloxicam (Mobic) 15 mg tablet            Return in about 3 months (around 7/13/2022) for Annual physical     A chart review was performed and previous primary care visit notes were reviewed  All applicable imaging studies were reviewed and images were reviewed personally  All applicable laboratory studies were reviewed personally  Care everywhere review was performed if  available and all pertinent notes were reviewed  Subjective:     HPI: Meghan Bee is a 46 y o  female who  has a past medical history of Disease of thyroid gland and Osteopenia  who presented to the office today for f/u  Main concern is she has been feeling fatigued  She does have hypothyroidism but has not been taking her 50 mcg of levothyroxine rather using her mother's 100 mcg and breaking in half  She went to physical therapy since I last saw her, was 3 referred to [facility] Spine but declines their further services  [facility] and will continue management for chronic low back pain with them  She continues PPI for GERD states this is helpful, she was treated for H pylori infection last year      The following portions of the patient's history were reviewed and updated as appropriate: allergies, current medications, past family history, past medical history, past social history, past surgical history, and problem list     Current Outpatient Medications on File Prior to Visit   Medication Sig Dispense Refill    cholecalciferol (VITAMIN D3) 1,000 units tablet Take 1 tablet (1,000 Units total) by mouth daily 30 tablet 5    cyclobenzaprine (FLEXERIL) 10 mg tablet Take 1 tablet (10 mg total) by mouth 2 (two) times a day as needed for muscle spasms (Patient not taking: Reported on 2/11/2022 ) 20 tablet 0    cyclobenzaprine (FLEXERIL) 10 mg tablet Take 1 tablet (10 mg total) by mouth 3 (three) times a day as needed for muscle spasms (Patient not taking: Reported on 2/11/2022 ) 30 tablet 0    ergocalciferol (VITAMIN D2) 50,000 units Take 1 capsule (50,000 Units total) by mouth once a week 8 capsule 0    levothyroxine 50 mcg tablet Take 1 tablet (50 mcg total) by mouth daily in the early morning 90 tablet 3    lidocaine (Lidoderm) 5 % Apply 1 patch topically daily Remove & Discard patch within 12 hours or as directed by MD 6 patch 0    loperamide (IMODIUM) 2 mg capsule Take 1 capsule (2 mg total) by mouth 4 (four) times a day as needed for diarrhea (Patient not taking: Reported on 2/11/2022 ) 30 capsule 0    miconazole (MONISTAT-7) 2 % vaginal cream Insert 1 applicator into the vagina daily at bedtime (Patient not taking: Reported on 2/11/2022 ) 45 g 0    Multiple Vitamin (multivitamin) tablet Take 1 tablet by mouth daily 100 tablet 3    oxymetazoline (AFRIN) 0 05 % nasal spray 2 sprays by Each Nare route 2 (two) times a day 30 mL 0    traMADol (ULTRAM) 50 mg tablet TAKE 1 TABLET BY MOUTH EVERY 6 HOURS AS NEEDED FOR MODERATE PAIN FOR UP TO 5 DAYS      [DISCONTINUED] meloxicam (Mobic) 15 mg tablet Take 1 tablet (15 mg total) by mouth daily 30 tablet 1    [DISCONTINUED] methocarbamol (ROBAXIN) 500 mg tablet Take 1 tablet (500 mg total) by mouth 2 (two) times a day 20 tablet 0    [DISCONTINUED] pantoprazole (PROTONIX) 20 mg tablet Take 2 tablets (40 mg total) by mouth daily 30 tablet 2     No current facility-administered medications on file prior to visit  Review of Systems   Constitutional: Negative  HENT: Negative  Eyes: Negative  Respiratory: Negative  Cardiovascular: Negative  Gastrointestinal: Negative  Endocrine: Negative  Genitourinary: Negative  Musculoskeletal: Positive for back pain  Skin: Negative  Allergic/Immunologic: Negative  Neurological: Negative  Psychiatric/Behavioral: Negative                Objective:    /74 (BP Location: Left arm, Patient Position: Sitting, Cuff Size: Standard)   Pulse 79   Temp 97 8 °F (36 6 °C) (Temporal)   Resp 18   Ht 5' 7" (1 702 m)   Wt 88 kg (194 lb)   SpO2 98%   BMI 30 38 kg/m²     Physical Exam  Vitals reviewed  Constitutional:       General: She is not in acute distress  Appearance: Normal appearance  HENT:      Head: Normocephalic  Right Ear: External ear normal       Left Ear: External ear normal       Nose: Nose normal  No congestion  Mouth/Throat:      Mouth: Mucous membranes are moist    Eyes:      Extraocular Movements: Extraocular movements intact  Conjunctiva/sclera: Conjunctivae normal    Cardiovascular:      Rate and Rhythm: Normal rate and regular rhythm  Heart sounds: Normal heart sounds  No murmur heard  No friction rub  No gallop  Pulmonary:      Effort: Pulmonary effort is normal       Breath sounds: Normal breath sounds  No wheezing  Musculoskeletal:         General: Normal range of motion  Cervical back: Normal range of motion and neck supple  No muscular tenderness  Right lower leg: No edema  Left lower leg: No edema  Skin:     General: Skin is warm and dry  Capillary Refill: Capillary refill takes less than 2 seconds  Neurological:      General: No focal deficit present  Mental Status: She is alert  Psychiatric:         Mood and Affect: Mood normal          Behavior: Behavior normal          Thought Content: Thought content normal          ANAT Collado  04/13/22  4:00 PM    Patient Instructions     Control del peso   LO QUE NECESITA SABER:   Tener sobrepeso aumenta nance riesgo de presentar problemas de vandana daniel enfermedades del corazón, hipertensión, diabetes tipo 2 y ciertos tipos de cáncer  También puede aumentar nance riesgo de presentar osteoartritis, apnea del sueño y otros problemas respiratorios  Trate de bajar de peso de forma gradual y [place]  Incluso more mínima pérdida de peso puede disminuir nance riesgo de problemas de [place]    [facility]:   Cómo bajar de peso de [place] forma robin: Stefanie Screen y saludable para perder peso es consumir menos calorías y realizar more actividad física en forma regular  · Usted puede perder hasta 1 beatriz por semana al reducir el consumo de 500 calorías cada día  Usted puede reducir el consumo de calorías al comer porciones más pequeñas o eliminar los alimentos con alto contenido de calorías  Pilar las etiquetas para determinar la cantidad de calorías que contienen los alimentos que consume  · También puede quemar calorías al realizar ejercicio daniel caminar, nadar o montar en bicicleta  Es más probable que usted [employer] peso si hace de estos cambios parte de nance estilo de erick  Realice more actividad física por lo menos 30 minutos al día, la mayoría de los días de la [place]  Usted también puede realizar más actividad física usando las escaleras en vez de los ascensores o estacionarse más lejos cuando Eleanora [place] a las tiendas  Pregunte a nance médico acerca del mejor plan de ejercicio para usted  Plan de alimentación saludable para el control del peso: Un plan de alimentación saludable incluye more variedad de alimentos, contiene más pocas calorías y lo ayuda a estar saludable  Un plan de alimentación saludable incluye lo siguiente:     · Consuma alimentos de grano integral con más frecuencia  Un plan de alimentación saludable debe contener alimentos con fibra  La fibra es la parte de las frutas, verduras y granos que nance cuerpo no puede digerir  Los alimentos de granos integrales son saludables y suministran fibra adicional a nance Renella Novak  Algunos ejemplos de alimentos de granos integrales son los panes integrales, pastas integrales, la paige, el arroz integral y flavio de bulgur  · Consuma more variedad de verduras todos los días  [address], coliflor, col noelle y [place]  Coma verduras anaranjadas daniel las zanahorias, paul dulces y calabaza de invierno  · Consuma more variedad de frutas todos los ronal  Escoja frutas frescas o enlatadas en nance propio jugo o con jugo bajo en [place] en vez de jugo   El jugo de frutas tiene [address] o lawanda nada de [place]  · Consuma productos lácteos con bajo contenido de [place]  [place] [address] de 1%  Consuma yogur descremado y requesón (cottage) semidescremado  Trate de consumir quesos descremados daniel el queso mozzarela y otros quesos semidescremado  · Seleccione codey y otros alimentos con proteínas bajos en grasa  Escoja frijoles u [address]  Seleccione pescado, carne de aves sin piel (daniel el lanre o [place]), kimble de carne [place] (de res o de cerdo)  Antes de cocinar las codey o las aves live cualquier parte de grasa visible  · Use menos grasas y aceites  Trate de hornear los alimentos en lugar de freírlos  Trate de hornear los alimentos en lugar de freírlos  Consuma menos alimentos de alto tenor graso  Coma menos alimentos altos en grasa daniel las paul fritas, donas, helados y pasteles  · Consuma menos dulces  Limite los alimentos y las bebidas con un gran contenido de azúcar  Estos incluyen los caramelos, galletas, gaseosas normales y bebidas endulzadas  Formas de reducir las calorías:  · [facility] porciones  ? Use platos pequeños para servirse porciones pequeñas  ? No coma segundas porciones  ? Cuando coma en un restaurante, pida more Navneet Rend y guarde en maddie la mitad de la comida antes de empezar a comer  ? Comparta con alguien un plato de entrada  · Reemplace los bocadillos o meriendas altos en calorías por los saludables de menos calorías  ? Escoja frutas frescas, verduras, galletas de arroz bajo en grasa o palomitas de maíz en lugar de comer paul fritas de paquete, nueces o dulces de chocolate  ? [place] agua o bebidas dietéticas en lugar de las endulzadas  · No vaya al iglesias cuando tenga hambre  Usted podría ser más propenso a elegir alimentos no saludables  Lleve more lista de alimentos saludables y vaya de compras después de vimal comido      · Coma marta comidas regularmente  No se salte ninguna comida  No omita ninguna comida porque esto puede conducir a comer más a more hora más tarde del día  [place] podría traerle problemas para perder peso  Si no tiene tiempo para hacer comidas regulares, consuma un refrigerio saludable  Hable con un dietista para que lo ayude a crear un plan de comidas y un horario que tressa adecuados para usted  Otras cosas que debe tener en cuenta mientras trata de bajar de peso:  · Esté consciente de las situaciones que podrían ocasionarle ganas de comer en exceso, daniel el comer mientras berenice la televisión  Busque formas para evitar estas situaciones  Por ejemplo, leer un libro, caminar o hacer trabajos manuales  · Reúnase con un radha de apoyo o con personas que también están tratando de bajar de [place]  [place] le puede ayudar a mantenerse motivado y continuar progresando en nance objetivo de perder peso  © [employer] 2022 Information is for End User's use only and may not be sold, redistributed or otherwise used for commercial purposes  All illustrations and images included in CareNotes® are the copyrighted property of [employer]  or [address] [place] [address] es sólo para uso en educación  Nance intención no es darle un consejo médico sobre enfermedades o tratamientos  Colsulte con nance Blair [place] farmacéutico antes de seguir cualquier régimen médico para saber si es seguro y efectivo para usted  Constipação   O QUE VOCÊ PRECISA SABER:   Constipação significa fezes endurecidas e secas ou um maior tempo entre as evacuações  INSTRUÇÕES APÓS A PATO:   Ligue para o seu cuidador se:  · Houver sangue anna fezes  · Você estiver com febre e com herlinda abdominal devido à constipação  · A constipação ficar mais forte  · Você começa a vomitar  · Você tiver dúvidas ou preocupações quanto ao seu problema de saúde ou daniel lidar com sumeet      Medicamentos:  · Medicamentos daniel um laxante podem ajudar a relaxar e soltar os intestinos para ajudá-lo a ter vashti evacuação  O médico poderá recomendar que você use laxantes apenas por um curto período de tempo  O uso por períodos prolongados pode fazer com que o intestino foreign fique dependente do medicamento  · [place] seu medicamento conforme orientado  Entre em contato com o seu médico se achar que o medicamento não está ajudando ou se você apresentar efeitos colaterais  Informe a sumeet se você for alérgico a algum medicamento  [place] vashti lista de todos os medicamentos, vitaminas e ervas (fitoterápicos) que você mary  Inclua a quantidade, quando e por que os [place]  Leve a lista ou os frascos de comprimidos às consultas de acompanhamento  Leve sua lista de medicamentos consigo em karen de emergência  Alívio da constipação:  · Um supositório pode ser usado para ajudar a suavizar a evacuação  Isso poderá ajudar a passagem  O supositório é colocado em seu reto pelo ânus  · Um enema é um medicamento líquido usado para limpar a evacuação do seu reto  O medicamento é colocado em seu reto pelo ânus  Evite a constipação:  · Erum líquidos conforme orientado  Talvez você precise beber mais líquido para ajudar a suavizar a evacuação  Pergunte quanto de líquido precisa por vic e quais líquidos são melhores para você  · Coma alimentos ricos em fibras  Isso pode ajudar a diminuir a constipação dando volume às fezes  Alimentos saudáveis incluem frutas, vegetais, cereais, pães integrais e feijões  Seu médico ou nutricionista podem ajudar você a elaborar vashti dieta mk em fibras  O médico também poderá recomendar um suplemento de fibras karen você não [place] obter vashti quantidade suficiente de fibras pela alimentação  · Faça exercícios regularmente  A atividade física regular pode estimular seus intestinos  Caminhar é um bom exercício para impedir ou aliviar constipação  Pergunte quais exercícios são melhores para você           · Programe um horário todos os vo para ir ao banheiro  isso pode ajudar seu corpo a evacuar regularmente  Curve-se para a frente quando estiver sentado no vaso sanitário para ajudar a eliminar as fezes  Fique pelo menos 10 minutos sentado no vaso sanitário, mesmo que você não esteja com vontade de evacuar  · Conception com o profissional de saúde sobre seus medicamentos  Alguns medicamentos podem causar constipação, daniel opioides  Seu médico poderá feng [place] em seus medicamentos  Por exemplo, seu médico poderá alterar o tipo de medicamento ou a frequência de uso  Faça o acompanhamento com o médico conforme orientado: Anote as dúvidas que você tiver para se lembrar de perguntar sobre elas adam as consultas  © Copyright [employer] 2022 Information is for End User's use only and may not be sold, redistributed or otherwise used for commercial purposes  All illustrations and images included in CareNotes® are the copyrighted property of [employer]  or [facility] Nestor Velasco   The above information is an  only  It is not intended as medical advice for individual conditions or treatments  Talk to your doctor, nurse or pharmacist before following any medical regimen to see if it is safe and effective for you

## 2022-04-13 NOTE — ASSESSMENT & PLAN NOTE
BMI above normal  Counseling and plan as documented below   -Pt acknowledges understanding and agrees to lifestyle changes   -Additional handouts on diet/exercise provided   -Will monitor progress at next scheduled follow-up    -refer to weight mgt

## 2022-04-28 ENCOUNTER — OFFICE VISIT (OUTPATIENT)
Dept: PAIN MEDICINE | Facility: MEDICAL CENTER | Age: 42
End: 2022-04-28
Payer: COMMERCIAL

## 2022-04-28 VITALS
WEIGHT: 194 LBS | BODY MASS INDEX: 30.45 KG/M2 | TEMPERATURE: 98.4 F | OXYGEN SATURATION: 96 % | HEIGHT: 67 IN | DIASTOLIC BLOOD PRESSURE: 74 MMHG | HEART RATE: 86 BPM | SYSTOLIC BLOOD PRESSURE: 106 MMHG

## 2022-04-28 DIAGNOSIS — M70.62 GREATER TROCHANTERIC BURSITIS OF LEFT HIP: ICD-10-CM

## 2022-04-28 DIAGNOSIS — M54.42 ACUTE BILATERAL LOW BACK PAIN WITH LEFT-SIDED SCIATICA: ICD-10-CM

## 2022-04-28 DIAGNOSIS — G57.02 PIRIFORMIS SYNDROME OF LEFT SIDE: ICD-10-CM

## 2022-04-28 DIAGNOSIS — M79.18 MYOFASCIAL PAIN SYNDROME: ICD-10-CM

## 2022-04-28 PROCEDURE — 99214 OFFICE O/P EST MOD 30 MIN: CPT | Performed by: NURSE PRACTITIONER

## 2022-04-28 RX ORDER — MELOXICAM 15 MG/1
15 TABLET ORAL DAILY
Qty: 30 TABLET | Refills: 2 | Status: SHIPPED | OUTPATIENT
Start: 2022-04-28

## 2022-04-28 NOTE — PROGRESS NOTES
Assessment  1  Greater trochanteric bursitis of left hip    2  Piriformis syndrome of left side    3  Myofascial pain syndrome    4  Acute bilateral low back pain with left-sided sciatica      , Ivan Melara, #[id number] was used for this office visit  Plan  The patient was seen in the office today for follow-up after multiple injections  She had her left piriformis muscle injected on 03/03/2022, her left greater trochanteric bursa on 03/23/2022 and her right greater trochanteric bursa on 04/04/2022  She states she is doing much better and that these injections relieved her pain symptoms by 90%  She states she was also getting good relief from meloxicam 15 mg that was prescribed for her therefore I refilled this medication today  She reports no side effects from this medication and will follow-up with us in 3 months for re-evaluation or sooner if she feels that she needs more injections  My impressions and treatment recommendations were discussed in detail with the patient who verbalized understanding and had no further questions  Discharge instructions were provided  I personally saw and examined the patient and I agree with the above discussed plan of care  No orders of the defined types were placed in this encounter  New Medications Ordered This Visit   Medications    meloxicam (Mobic) 15 mg tablet     Sig: Take 1 tablet (15 mg total) by mouth daily     Dispense:  30 tablet     Refill:  2       History of Present Illness    Orquidea Marie is a 46 y o  female who presents to the office with a pain score of 2/10 that is occasional and worse at night  She describes the quality as pressure-like in her low back  She states that the injections that she had helped her pain symptoms by 90%  I have personally reviewed and/or updated the patient's past medical history, past surgical history, family history, social history, current medications, allergies, and vital signs today       Review of Systems   Respiratory: Negative for shortness of breath  Cardiovascular: Negative for chest pain  Gastrointestinal: Negative for constipation, diarrhea, nausea and vomiting  Musculoskeletal: Positive for back pain, gait problem and myalgias  Negative for arthralgias and joint swelling  Skin: Negative for rash  Neurological: Negative for dizziness, seizures and weakness  All other systems reviewed and are negative  Patient Active Problem List   Diagnosis    Bruising, spontaneous    Healthcare maintenance    BMI 30 0-30 9,adult    Other specified hypothyroidism    Frequent nosebleeds    Encounter for routine gynecological examination with Papanicolaou smear of cervix    Vaginal dysplasia    Pain in left lumbar region of back    Piriformis syndrome of left side    Myofascial pain syndrome    Greater trochanteric bursitis of right hip    Gastroesophageal reflux disease without esophagitis    Vitamin D deficiency       Past Medical History:   Diagnosis Date    Chronic hip pain     Disease of thyroid gland     Osteopenia        Past Surgical History:   Procedure Laterality Date    CERVICAL BIOPSY  W/ LOOP ELECTRODE EXCISION N/A      SECTION N/A     LEFT OOPHORECTOMY Left     TOTAL ABDOMINAL HYSTERECTOMY W/ BILATERAL SALPINGOOPHORECTOMY Right        Family History   Problem Relation Age of Onset    No Known Problems Mother     No Known Problems Father        Social History     Occupational History    Not on file   Tobacco Use    Smoking status: Never Smoker    Smokeless tobacco: Never Used   Vaping Use    Vaping Use: Never used   Substance and Sexual Activity    Alcohol use:  Yes    Drug use: Never    Sexual activity: Yes     Partners: Male       Current Outpatient Medications on File Prior to Visit   Medication Sig    cholecalciferol (VITAMIN D3) 1,000 units tablet Take 1 tablet (1,000 Units total) by mouth daily    levothyroxine 50 mcg tablet Take 1 tablet (50 mcg total) by mouth daily in the early morning    lidocaine (Lidoderm) 5 % Apply 1 patch topically daily Remove & Discard patch within 12 hours or as directed by MD    methocarbamol (ROBAXIN) 500 mg tablet Take 1 tablet (500 mg total) by mouth 2 (two) times a day    oxymetazoline (AFRIN) 0 05 % nasal spray 2 sprays by Each Nare route 2 (two) times a day    pantoprazole (PROTONIX) 20 mg tablet Take 2 tablets (40 mg total) by mouth daily    senna (SENOKOT) 8 6 mg Take 1 tablet (8 6 mg total) by mouth daily at bedtime    [DISCONTINUED] meloxicam (Mobic) 15 mg tablet Take 1 tablet (15 mg total) by mouth daily    cholecalciferol (VITAMIN D3) 1,000 units tablet Take 1 tablet (1,000 Units total) by mouth daily (Patient not taking: Reported on 4/28/2022 )    cyclobenzaprine (FLEXERIL) 10 mg tablet Take 1 tablet (10 mg total) by mouth 2 (two) times a day as needed for muscle spasms (Patient not taking: Reported on 2/11/2022 )    cyclobenzaprine (FLEXERIL) 10 mg tablet Take 1 tablet (10 mg total) by mouth 3 (three) times a day as needed for muscle spasms (Patient not taking: Reported on 2/11/2022 )    ergocalciferol (VITAMIN D2) 50,000 units Take 1 capsule (50,000 Units total) by mouth once a week (Patient not taking: Reported on 4/28/2022 )    ergocalciferol (VITAMIN D2) 50,000 units Take 1 capsule (50,000 Units total) by mouth once a week (Patient not taking: Reported on 4/28/2022 )    loperamide (IMODIUM) 2 mg capsule Take 1 capsule (2 mg total) by mouth 4 (four) times a day as needed for diarrhea (Patient not taking: Reported on 2/11/2022 )    miconazole (MONISTAT-7) 2 % vaginal cream Insert 1 applicator into the vagina daily at bedtime (Patient not taking: Reported on 2/11/2022 )    Multiple Vitamin (multivitamin) tablet Take 1 tablet by mouth daily (Patient not taking: Reported on 4/28/2022 )    traMADol (ULTRAM) 50 mg tablet TAKE 1 TABLET BY MOUTH EVERY 6 HOURS AS NEEDED FOR MODERATE PAIN FOR UP TO 5 DAYS (Patient not taking: Reported on 4/28/2022)     No current facility-administered medications on file prior to visit         No Known Allergies    Physical Exam    /74   Pulse 86   Temp 98 4 °F (36 9 °C)   Ht 5' 7" (1 702 m)   Wt 88 kg (194 lb)   SpO2 96%   BMI 30 38 kg/m²     Constitutional: obese  Eyes: anicteric  HEENT: grossly intact  Neck: supple, symmetric, trachea midline and no masses   Pulmonary:even and unlabored  Cardiovascular:No edema or pitting edema present  Skin:Normal without rashes or lesions and well hydrated  Psychiatric:Mood and affect appropriate  Neurologic:Cranial Nerves II-XII grossly intact  Musculoskeletal:normal    Imaging

## 2022-05-04 DIAGNOSIS — M54.42 ACUTE BILATERAL LOW BACK PAIN WITH LEFT-SIDED SCIATICA: ICD-10-CM

## 2022-05-04 DIAGNOSIS — M70.62 GREATER TROCHANTERIC BURSITIS OF LEFT HIP: ICD-10-CM

## 2022-05-04 DIAGNOSIS — G57.02 PIRIFORMIS SYNDROME OF LEFT SIDE: ICD-10-CM

## 2022-05-04 DIAGNOSIS — M79.18 MYOFASCIAL PAIN SYNDROME: ICD-10-CM

## 2022-05-04 DIAGNOSIS — A04.8 H. PYLORI INFECTION: ICD-10-CM

## 2022-05-04 RX ORDER — MELOXICAM 15 MG/1
15 TABLET ORAL DAILY
Qty: 30 TABLET | Refills: 2 | OUTPATIENT
Start: 2022-05-04

## 2022-05-04 RX ORDER — PANTOPRAZOLE SODIUM 40 MG/1
40 TABLET, DELAYED RELEASE ORAL DAILY
Qty: 30 TABLET | Refills: 0 | Status: SHIPPED | OUTPATIENT
Start: 2022-05-04 | End: 2022-05-17 | Stop reason: SDUPTHER

## 2022-05-04 NOTE — TELEPHONE ENCOUNTER
I changed her protonix script to 40mg tab daily and the meloxicam is managed by spine and pain, refilled 3 days ago

## 2022-05-04 NOTE — TELEPHONE ENCOUNTER
pt called requesting refill on the medications pantoprazole (PROTONIX) 20 mg tablet, meloxicam (Mobic) 15 mg tablet

## 2022-05-17 ENCOUNTER — OFFICE VISIT (OUTPATIENT)
Dept: GYNECOLOGIC ONCOLOGY | Facility: CLINIC | Age: 42
End: 2022-05-17
Payer: COMMERCIAL

## 2022-05-17 VITALS
WEIGHT: 197 LBS | SYSTOLIC BLOOD PRESSURE: 122 MMHG | TEMPERATURE: 97.7 F | DIASTOLIC BLOOD PRESSURE: 78 MMHG | HEIGHT: 67 IN | BODY MASS INDEX: 30.92 KG/M2

## 2022-05-17 DIAGNOSIS — K21.9 GASTROESOPHAGEAL REFLUX DISEASE WITHOUT ESOPHAGITIS: ICD-10-CM

## 2022-05-17 DIAGNOSIS — A04.8 H. PYLORI INFECTION: ICD-10-CM

## 2022-05-17 DIAGNOSIS — N89.3 VAGINAL DYSPLASIA: Primary | ICD-10-CM

## 2022-05-17 DIAGNOSIS — Z12.31 ENCOUNTER FOR SCREENING MAMMOGRAM FOR MALIGNANT NEOPLASM OF BREAST: ICD-10-CM

## 2022-05-17 DIAGNOSIS — B37.3 CANDIDIASIS OF VAGINA: ICD-10-CM

## 2022-05-17 PROBLEM — B37.31 CANDIDIASIS OF VAGINA: Status: ACTIVE | Noted: 2022-05-17

## 2022-05-17 PROCEDURE — 88175 CYTOPATH C/V AUTO FLUID REDO: CPT | Performed by: OBSTETRICS & GYNECOLOGY

## 2022-05-17 PROCEDURE — 87624 HPV HI-RISK TYP POOLED RSLT: CPT | Performed by: OBSTETRICS & GYNECOLOGY

## 2022-05-17 PROCEDURE — 99213 OFFICE O/P EST LOW 20 MIN: CPT | Performed by: OBSTETRICS & GYNECOLOGY

## 2022-05-17 RX ORDER — PANTOPRAZOLE SODIUM 40 MG/1
40 TABLET, DELAYED RELEASE ORAL DAILY
Qty: 30 TABLET | Refills: 0 | Status: SHIPPED | OUTPATIENT
Start: 2022-05-17 | End: 2022-07-11

## 2022-05-17 RX ORDER — FLUCONAZOLE 150 MG/1
150 TABLET ORAL ONCE
Qty: 1 TABLET | Refills: 0 | Status: SHIPPED | OUTPATIENT
Start: 2022-05-17 | End: 2022-05-17

## 2022-05-17 NOTE — PROGRESS NOTES
Assessment/Plan:    Problem List Items Addressed This Visit        Digestive    Gastroesophageal reflux disease without esophagitis     Patient requested refill of pantoprazole  Revealed sent to pharmacy  Relevant Medications    pantoprazole (PROTONIX) 40 mg tablet       Genitourinary    Vaginal dysplasia - Primary     Atypical squamous cells with high-risk HPV  I counseled patient about lack of benefit to screen for HPV post hysterectomy  In the absence of high-grade dysplasia or gross lesions no intervention needed  I obtained cytology today  I will follow up results  In the absence of high-grade abnormalities she should follow-up with primary gynecology for yearly cytologic exams if/as indicated  Candidiasis of vagina     Symptomatic  Patient with frothy whitish discharge and pruritus  Diflucan 150 mg p o  x1 prescribed  Relevant Medications    fluconazole (DIFLUCAN) 150 mg tablet       Other    Encounter for screening mammogram for malignant neoplasm of breast     Approaching 42  Will order MMG accordingly  Relevant Orders    Mammo screening bilateral w 3d & cad      Other Visit Diagnoses     H  pylori infection        Relevant Medications    pantoprazole (PROTONIX) 40 mg tablet            CHIEF COMPLAINT:   Follow-up for vaginal dysplasia, complains of vaginal discharge and itching, almost due for mammogram, requests refill of Protonix prescription  Patient ID: Skye Patricia is a 46 y o  female  HPI  37-year-old status post hysterectomy for cervical dysplasia  Referred to us for atypical squamous cells of the vagina with high risk HPV  Gross examination and colposcopy was negative  Presents for follow-up  Reports vaginal discharge which is whitish and causes pruritus  Reports normal bowel bladder function  She will turn 42 later this year and I counseled her about importance of breast cancer screening    The following portions of the patient's history were reviewed and updated as appropriate: allergies, current medications, past family history, past medical history, past social history, past surgical history and problem list     Review of Systems  As per HPI  Twelve point review of systems otherwise unremarkable  Current Outpatient Medications   Medication Sig Dispense Refill    fluconazole (DIFLUCAN) 150 mg tablet Take 1 tablet (150 mg total) by mouth once for 1 dose 1 tablet 0    pantoprazole (PROTONIX) 40 mg tablet Take 1 tablet (40 mg total) by mouth in the morning   30 tablet 0    cholecalciferol (VITAMIN D3) 1,000 units tablet Take 1 tablet (1,000 Units total) by mouth daily 30 tablet 5    cholecalciferol (VITAMIN D3) 1,000 units tablet Take 1 tablet (1,000 Units total) by mouth daily (Patient not taking: Reported on 4/28/2022 ) 30 tablet 2    cyclobenzaprine (FLEXERIL) 10 mg tablet Take 1 tablet (10 mg total) by mouth 2 (two) times a day as needed for muscle spasms (Patient not taking: Reported on 2/11/2022 ) 20 tablet 0    cyclobenzaprine (FLEXERIL) 10 mg tablet Take 1 tablet (10 mg total) by mouth 3 (three) times a day as needed for muscle spasms (Patient not taking: Reported on 2/11/2022 ) 30 tablet 0    ergocalciferol (VITAMIN D2) 50,000 units Take 1 capsule (50,000 Units total) by mouth once a week (Patient not taking: Reported on 4/28/2022 ) 8 capsule 0    ergocalciferol (VITAMIN D2) 50,000 units Take 1 capsule (50,000 Units total) by mouth once a week (Patient not taking: Reported on 4/28/2022 ) 8 capsule 0    levothyroxine 50 mcg tablet Take 1 tablet (50 mcg total) by mouth daily in the early morning 90 tablet 3    lidocaine (Lidoderm) 5 % Apply 1 patch topically daily Remove & Discard patch within 12 hours or as directed by MD 6 patch 0    loperamide (IMODIUM) 2 mg capsule Take 1 capsule (2 mg total) by mouth 4 (four) times a day as needed for diarrhea (Patient not taking: Reported on 2/11/2022 ) 30 capsule 0    meloxicam (Mobic) 15 mg tablet Take 1 tablet (15 mg total) by mouth daily 30 tablet 2    methocarbamol (ROBAXIN) 500 mg tablet Take 1 tablet (500 mg total) by mouth 2 (two) times a day 20 tablet 0    miconazole (MONISTAT-7) 2 % vaginal cream Insert 1 applicator into the vagina daily at bedtime (Patient not taking: Reported on 2/11/2022 ) 45 g 0    Multiple Vitamin (multivitamin) tablet Take 1 tablet by mouth daily (Patient not taking: Reported on 4/28/2022 ) 100 tablet 3    oxymetazoline (AFRIN) 0 05 % nasal spray 2 sprays by Each Nare route 2 (two) times a day 30 mL 0    senna (SENOKOT) 8 6 mg Take 1 tablet (8 6 mg total) by mouth daily at bedtime 30 tablet 5    traMADol (ULTRAM) 50 mg tablet TAKE 1 TABLET BY MOUTH EVERY 6 HOURS AS NEEDED FOR MODERATE PAIN FOR UP TO 5 DAYS (Patient not taking: Reported on 4/28/2022)       No current facility-administered medications for this visit  Objective:    Blood pressure 122/78, temperature 97 7 °F (36 5 °C), temperature source Tympanic, height 5' 7" (1 702 m), weight 89 4 kg (197 lb), not currently breastfeeding  Body mass index is 30 85 kg/m²  Body surface area is 2 01 meters squared  Physical Exam  Vitals reviewed  Exam conducted with a chaperone present  Constitutional:       General: She is not in acute distress  Appearance: She is obese  Pulmonary:      Effort: Pulmonary effort is normal    Abdominal:      General: Abdomen is flat  There is no distension  Palpations: Abdomen is soft  There is no mass  Tenderness: There is no abdominal tenderness  There is no guarding  Hernia: No hernia is present  Genitourinary:     Comments: Normal external female genitalia  Normal Bartholin's and Chicken's glands  Normal urethral meatus and no evidence of urethral discharge or masses  Bladder without fullness mass or tenderness  Vagina with whitish frothy discharge, no lesions  No significant pelvic organ prolapse noted    Cervix and uterus are surgically absent  Bimanual exam demonstrates no evidence of pelvic masses  Anus without fissure of lesion  Musculoskeletal:      Left lower leg: No edema  Neurological:      Mental Status: She is alert         Catrachito Sandy MD, [facility]  5/17/2022  8:36 AM

## 2022-05-17 NOTE — ASSESSMENT & PLAN NOTE
Symptomatic  Patient with frothy whitish discharge and pruritus  Diflucan 150 mg p o  x1 prescribed

## 2022-05-17 NOTE — ASSESSMENT & PLAN NOTE
Atypical squamous cells with high-risk HPV  I counseled patient about lack of benefit to screen for HPV post hysterectomy  In the absence of high-grade dysplasia or gross lesions no intervention needed  I obtained cytology today  I will follow up results  In the absence of high-grade abnormalities she should follow-up with primary gynecology for yearly cytologic exams if/as indicated

## 2022-05-19 DIAGNOSIS — E03.8 OTHER SPECIFIED HYPOTHYROIDISM: ICD-10-CM

## 2022-05-19 RX ORDER — LEVOTHYROXINE SODIUM 0.05 MG/1
50 TABLET ORAL
Qty: 90 TABLET | Refills: 3 | Status: SHIPPED | OUTPATIENT
Start: 2022-05-19

## 2022-05-19 NOTE — TELEPHONE ENCOUNTER
05/19/22    Received call from patient:    Patient requesting Refill on the Following Med:   levothyroxine 50 mcg tablet     Any questions please call PT @  [phone]         Last Appt: 04/13/22

## 2022-05-25 LAB
LAB AP GYN PRIMARY INTERPRETATION: NORMAL
Lab: NORMAL
PATH INTERP SPEC-IMP: NORMAL

## 2022-07-11 DIAGNOSIS — A04.8 H. PYLORI INFECTION: ICD-10-CM

## 2022-07-11 RX ORDER — PANTOPRAZOLE SODIUM 40 MG/1
40 TABLET, DELAYED RELEASE ORAL DAILY
Qty: 30 TABLET | Refills: 0 | Status: SHIPPED | OUTPATIENT
Start: 2022-07-11 | End: 2022-08-09

## 2022-08-06 DIAGNOSIS — A04.8 H. PYLORI INFECTION: ICD-10-CM

## 2022-08-09 RX ORDER — PANTOPRAZOLE SODIUM 40 MG/1
40 TABLET, DELAYED RELEASE ORAL DAILY
Qty: 90 TABLET | Refills: 1 | Status: SHIPPED | OUTPATIENT
Start: 2022-08-09

## 2022-10-12 PROBLEM — Z00.00 HEALTHCARE MAINTENANCE: Status: RESOLVED | Noted: 2021-04-09 | Resolved: 2022-10-12

## 2022-11-02 ENCOUNTER — HOSPITAL ENCOUNTER (OUTPATIENT)
Dept: MAMMOGRAPHY | Facility: CLINIC | Age: 42
Discharge: HOME | End: 2022-11-02

## 2022-11-02 VITALS — WEIGHT: 197 LBS | BODY MASS INDEX: 30.92 KG/M2 | HEIGHT: 67 IN

## 2022-11-02 DIAGNOSIS — Z12.31 ENCOUNTER FOR SCREENING MAMMOGRAM FOR MALIGNANT NEOPLASM OF BREAST: ICD-10-CM

## 2022-12-14 DIAGNOSIS — E03.8 OTHER SPECIFIED HYPOTHYROIDISM: ICD-10-CM

## 2022-12-14 RX ORDER — LEVOTHYROXINE SODIUM 0.05 MG/1
50 TABLET ORAL
Qty: 90 TABLET | Refills: 1 | Status: SHIPPED | OUTPATIENT
Start: 2022-12-14

## 2022-12-15 NOTE — PROGRESS NOTES
Name: Hina Austin      : 1980      MRN: [id number]  Encounter Provider: ANAT Toney  Encounter Date: 2022   Encounter department: [facility]    Assessment & Plan     1  Vitamin D deficiency  Assessment & Plan:  - Will check Vit D levels  - Continue daily otc supplements    Orders:  -     Vitamin D 25 hydroxy; Future  -     cholecalciferol (VITAMIN D3) 1,000 units tablet; Take 1 tablet (1,000 Units total) by mouth daily    2  Other specified hypothyroidism  Assessment & Plan:  Lab Results   Component Value Date    [id number] 4 060 2022     - Check TSH  - Continue synthroid 50 mcg daily    Orders:  -     TSH, 3rd generation with Free T4 reflex; Future    BMI Counseling: There is no height or weight on file to calculate BMI  The BMI is above normal  Nutrition recommendations include decreasing portion sizes, encouraging healthy choices of fruits and vegetables, decreasing fast food intake, consuming healthier snacks, limiting drinks that contain sugar, moderation in carbohydrate intake, increasing intake of lean protein, reducing intake of saturated and trans fat and reducing intake of cholesterol  Exercise recommendations include moderate physical activity 150 minutes/week  No pharmacotherapy was ordered  Rationale for BMI follow-up plan is due to patient being overweight or obese  Depression Screening and Follow-up Plan: Patient was screened for depression during today's encounter  They screened negative with a PHQ-2 score of 2  Subjective      Hina Austin is a 38 y o  female  has a past medical history of Chronic hip pain, Disease of thyroid gland, and Osteopenia  has a past surgical history that includes Total abdominal hysterectomy w/ bilateral salpingoophorectomy (Right); Cervical biopsy w/ loop electrode excision (N/A);  section (N/A); and Left oophorectomy (Left)      She presents today to follow-up on her hypothyroidism  Review of Systems   Constitutional: Positive for fatigue  Negative for chills and fever  HENT: Negative for ear pain and sore throat  Eyes: Negative for pain and visual disturbance  Respiratory: Negative for cough and shortness of breath  Cardiovascular: Negative for chest pain and palpitations  Gastrointestinal: Negative for abdominal pain and vomiting  Genitourinary: Negative for dysuria and hematuria  Musculoskeletal: Negative for arthralgias and back pain  Skin: Negative for color change and rash  Neurological: Negative for seizures and syncope  All other systems reviewed and are negative        Current Outpatient Medications on File Prior to Visit   Medication Sig   • levothyroxine 50 mcg tablet Take 1 tablet (50 mcg total) by mouth daily in the early morning   • [DISCONTINUED] cholecalciferol (VITAMIN D3) 1,000 units tablet Take 1 tablet (1,000 Units total) by mouth daily   • [DISCONTINUED] cholecalciferol (VITAMIN D3) 1,000 units tablet Take 1 tablet (1,000 Units total) by mouth daily (Patient not taking: Reported on 4/28/2022 )   • [DISCONTINUED] cyclobenzaprine (FLEXERIL) 10 mg tablet Take 1 tablet (10 mg total) by mouth 2 (two) times a day as needed for muscle spasms (Patient not taking: Reported on 2/11/2022 )   • [DISCONTINUED] cyclobenzaprine (FLEXERIL) 10 mg tablet Take 1 tablet (10 mg total) by mouth 3 (three) times a day as needed for muscle spasms (Patient not taking: Reported on 2/11/2022 )   • [DISCONTINUED] ergocalciferol (VITAMIN D2) 50,000 units Take 1 capsule (50,000 Units total) by mouth once a week (Patient not taking: Reported on 4/28/2022 )   • [DISCONTINUED] ergocalciferol (VITAMIN D2) 50,000 units Take 1 capsule (50,000 Units total) by mouth once a week (Patient not taking: Reported on 4/28/2022 )   • [DISCONTINUED] lidocaine (Lidoderm) 5 % Apply 1 patch topically daily Remove & Discard patch within 12 hours or as directed by MD   • [DISCONTINUED] loperamide (IMODIUM) 2 mg capsule Take 1 capsule (2 mg total) by mouth 4 (four) times a day as needed for diarrhea (Patient not taking: Reported on 2/11/2022 )   • [DISCONTINUED] meloxicam (Mobic) 15 mg tablet Take 1 tablet (15 mg total) by mouth daily   • [DISCONTINUED] methocarbamol (ROBAXIN) 500 mg tablet Take 1 tablet (500 mg total) by mouth 2 (two) times a day   • [DISCONTINUED] miconazole (MONISTAT-7) 2 % vaginal cream Insert 1 applicator into the vagina daily at bedtime (Patient not taking: Reported on 2/11/2022 )   • [DISCONTINUED] Multiple Vitamin (multivitamin) tablet Take 1 tablet by mouth daily (Patient not taking: Reported on 4/28/2022 )   • [DISCONTINUED] oxymetazoline (AFRIN) 0 05 % nasal spray 2 sprays by Each Nare route 2 (two) times a day   • [DISCONTINUED] pantoprazole (PROTONIX) 40 mg tablet TAKE 1 TABLET (40 MG TOTAL) BY MOUTH IN THE MORNING  • [DISCONTINUED] senna (SENOKOT) 8 6 mg Take 1 tablet (8 6 mg total) by mouth daily at bedtime   • [DISCONTINUED] traMADol (ULTRAM) 50 mg tablet TAKE 1 TABLET BY MOUTH EVERY 6 HOURS AS NEEDED FOR MODERATE PAIN FOR UP TO 5 DAYS (Patient not taking: Reported on 4/28/2022)       Objective     /86 (BP Location: Left arm, Patient Position: Sitting, Cuff Size: Standard)   Pulse 75   Temp (!) 96 7 °F (35 9 °C) (Temporal)   Resp 18   Ht 5' 7" (1 702 m)   Wt 87 5 kg (193 lb)   SpO2 99%   BMI 30 23 kg/m²     Physical Exam  Vitals and nursing note reviewed  Constitutional:       Appearance: She is obese  HENT:      Head: Normocephalic and atraumatic  Right Ear: External ear normal       Left Ear: External ear normal       Nose: Nose normal    Eyes:      Extraocular Movements: Extraocular movements intact  Conjunctiva/sclera: Conjunctivae normal       Pupils: Pupils are equal, round, and reactive to light  Cardiovascular:      Rate and Rhythm: Normal rate and regular rhythm  Pulses: Normal pulses        Heart sounds: Normal heart sounds  Pulmonary:      Effort: Pulmonary effort is normal       Breath sounds: Normal breath sounds  Abdominal:      General: Bowel sounds are normal       Palpations: Abdomen is soft  Tenderness: There is no abdominal tenderness  Musculoskeletal:         General: Normal range of motion  Cervical back: Normal range of motion  Skin:     General: Skin is warm and dry  Neurological:      General: No focal deficit present  Mental Status: She is alert and oriented to person, place, and time  Mental status is at baseline  Psychiatric:         Mood and Affect: Mood normal          Behavior: Behavior normal          Thought Content:  Thought content normal        ANAT Plaza

## 2022-12-16 ENCOUNTER — OFFICE VISIT (OUTPATIENT)
Dept: FAMILY MEDICINE CLINIC | Facility: CLINIC | Age: 42
End: 2022-12-16

## 2022-12-16 ENCOUNTER — APPOINTMENT (OUTPATIENT)
Dept: LAB | Facility: HOSPITAL | Age: 42
End: 2022-12-16

## 2022-12-16 VITALS
OXYGEN SATURATION: 99 % | TEMPERATURE: 96.7 F | HEART RATE: 75 BPM | SYSTOLIC BLOOD PRESSURE: 124 MMHG | RESPIRATION RATE: 18 BRPM | HEIGHT: 67 IN | WEIGHT: 193 LBS | DIASTOLIC BLOOD PRESSURE: 86 MMHG | BODY MASS INDEX: 30.29 KG/M2

## 2022-12-16 DIAGNOSIS — E55.9 VITAMIN D DEFICIENCY: Primary | ICD-10-CM

## 2022-12-16 DIAGNOSIS — E03.8 OTHER SPECIFIED HYPOTHYROIDISM: ICD-10-CM

## 2022-12-16 DIAGNOSIS — E55.9 VITAMIN D DEFICIENCY: ICD-10-CM

## 2022-12-16 PROBLEM — R23.3 BRUISING, SPONTANEOUS: Status: RESOLVED | Noted: 2021-04-09 | Resolved: 2022-12-16

## 2022-12-16 PROBLEM — Z01.419 ENCOUNTER FOR ROUTINE GYNECOLOGICAL EXAMINATION WITH PAPANICOLAOU SMEAR OF CERVIX: Status: RESOLVED | Noted: 2021-09-15 | Resolved: 2022-12-16

## 2022-12-16 PROBLEM — R04.0 FREQUENT NOSEBLEEDS: Status: RESOLVED | Noted: 2021-04-09 | Resolved: 2022-12-16

## 2022-12-16 PROBLEM — Z12.31 ENCOUNTER FOR SCREENING MAMMOGRAM FOR MALIGNANT NEOPLASM OF BREAST: Status: RESOLVED | Noted: 2022-05-17 | Resolved: 2022-12-16

## 2022-12-16 LAB — TSH SERPL DL<=0.05 MIU/L-ACNC: 4.08 UIU/ML (ref 0.45–4.5)

## 2022-12-16 RX ORDER — MELATONIN
1000 DAILY
Qty: 90 TABLET | Refills: 1 | Status: SHIPPED | OUTPATIENT
Start: 2022-12-16

## 2022-12-16 NOTE — ASSESSMENT & PLAN NOTE
- General Post-Op/Procedure Note


Date of Surgery/Procedure: 09/22/20


Operative Procedure(s): Laparoscopically assisted total vaginal hysterectomy 

with bilateral salpingo-oophorectomy.


Findings: 





Small benign-appearing cyst left ovary/fallopian tube.  Uterus normal size.  

Distal right fallopian tube surgically absent.  Appendix surgically absent.  

Right and left ovaries otherwise normal.  Patient has a grade 2 cystocele, grade

1-2 rectocele.  Grade 1-2 uterine descensus present.  Gallbladder and liver edge

right and left lobe within normal limits.


Pre Op Diagnosis: 1.  Thickened endometrium.  2.  Postmenopausal uterine 

bleeding.  3.  Left ovarian cyst-complex


Post-Op Diagnosis: Same


Anesthesia Technique: General ET Tube


Other Anesthesia Type: Lidocaine quarter percent with [id number] cc total.  

Marcaine 0.5% - 15


Primary Surgeon: Arley Keen


Secondary Surgeon: Fly Cano


Anesthesia Provider: Gisela Joya


Assistant: Brooke Singh


Reason Assistant Was Necessary: 





Retraction, assistance, patient safety, quality of care.


Pathology: 





Uterus, portion of right fallopian tube, complete left fallopian tubes and both 

ovaries as 1 specimen.


Fluid Replacement, Intraop: 1,300


Output, Urine Amount: 100


EBL in mLs: 50


Drain/Tube Comments:: Indwelling bladder catheter during surgery only.


Complications: None


Condition: Good


Free Text/Narrative:: 


Surgery duration: 48 minutes





The patient was taken to the operating room placed in supine position on the 

operating table. She received 2 g of Ancef preoperatively for infection 

prophylaxis. She had signed consent previously. After adequate anesthesia 

patient was placed in a dorsal lithotomy position. It should be noted she had 

sequential compression stockings in place for DVT prophylaxis. A uterine 

manipulator was placed as was an indwelling bladder catheter. This was done 

after adequate prepping and draping. The patient was placed in supine position 

and 3 laparoscopic port sites were developed. Marcaine 0.5% approximately 3-5 mL

was injected at each site. Verres needle was placed and pneumoperitoneum was 

achieved with 3 L of CO2. Infraumbilical and 2 lateral port sites were 

developed. Under laparoscopic guidance the upper portion of the hysterectomy was

performed. The right infundibulopelvic ligament was elevated and crossclamped 

using the endoseal computerized cautery device. The round ligament was taken 

down to the broad ligament.  At this time attention was turned to the left side 

and the left infundibulopelvic ligament and the triple ligament were then taken 

down in a similar fashion. Broad ligament was taken down to the area of the 

uterine vasculature. Uterine vasculature was developed in the usual fashion 

using the cautery system. Both uterine arteries were identified and developed.





Vaginal approach was then undertaken. The patient was placed in the dorsal 

lithotomy position and a weighted speculum was placed in the vagina. The cervix 

was injected with lidocaine quarter percent with epinephrine 20 mL total.  A 

full circumference incision was made through the epithelium around the cervix. 

Posterior cul-de-sac was entered without problems. The left uterosacral ligament

and then the right uterosacral were taken down using the Enseal vessel closure 

system. The cardinal ligament and what remained of the uterine vascular vessels 

and cervical branches of the vessels were managed with the Enseal vessel closure

system on each side. Anterior cul-de-sac was then entered and the remaining 

portion of broad ligament on the right side and a small portion of broad 

ligament remaining on the left side were then developed in the usual fashion. 

Uterus was then removed.  At this point the uterus was completely removed and 

sent as specimen. The vaginal cuff was then run with a locked running suture of 

0 Monocryl from the 2 o'clock position to the 10 o'clock position. The vagina 

was closed with a running locked suture of 0 Monocryl. Hemostasis was confirmed 

this time and no bleeding was noted.





Laparoscopy was then performed to ensure hemostasis. Pneumoperitoneum was 

reestablished and the laparoscope was placed. The pelvis was found to be 

hemostatically intact. There was no evidence of any bowel adhesion to the 

vaginal cuff area noted. The sleeves were removed under direct visualization and

the upper sleeve was removed after reversal of the pneumoperitoneum. Each of 

these sites were closed with a single interrupted suture of 3-0 Monocryl. They 

were further approximated with Dermabond skin glue. At this point the patient 

was awakened from general endotracheal anesthesia. The Robison catheter had been 

removed by this time. She is discharged from the operating room in good 

condition. - Will check Vit D levels  - Continue daily otc supplements

## 2022-12-16 NOTE — ASSESSMENT & PLAN NOTE
Lab Results   Component Value Date    [id number] 4 060 04/13/2022     - Check TSH  - Continue synthroid 50 mcg daily

## 2022-12-18 LAB — 25(OH)D3 SERPL-MCNC: 29.6 NG/ML (ref 30–100)

## 2022-12-27 ENCOUNTER — HOSPITAL ENCOUNTER (OUTPATIENT)
Dept: MAMMOGRAPHY | Facility: CLINIC | Age: 42
Discharge: HOME | End: 2022-12-27

## 2022-12-27 VITALS — HEIGHT: 67 IN | WEIGHT: 193 LBS | BODY MASS INDEX: 30.29 KG/M2

## 2022-12-27 DIAGNOSIS — R92.8 ABNORMAL MAMMOGRAM: Primary | ICD-10-CM

## 2022-12-27 DIAGNOSIS — R92.8 ABNORMAL MAMMOGRAM: ICD-10-CM

## 2023-01-04 ENCOUNTER — TELEPHONE (OUTPATIENT)
Dept: FAMILY MEDICINE CLINIC | Facility: CLINIC | Age: 43
End: 2023-01-04

## 2023-01-04 DIAGNOSIS — E55.9 VITAMIN D DEFICIENCY: ICD-10-CM

## 2023-01-04 RX ORDER — MELATONIN
1000 DAILY
Qty: 90 TABLET | Refills: 1 | Status: SHIPPED | OUTPATIENT
Start: 2023-01-04

## 2023-01-04 NOTE — TELEPHONE ENCOUNTER
Pt called the nurse line stating that she has been waiting for a medication  Called pt to clarify per pt vit d was supposed to be sent advise pt medication was sent on 12/16/22

## 2023-01-11 ENCOUNTER — TELEPHONE (OUTPATIENT)
Dept: FAMILY MEDICINE CLINIC | Facility: CLINIC | Age: 43
End: 2023-01-11

## 2023-01-26 ENCOUNTER — APPOINTMENT (OUTPATIENT)
Dept: LAB | Facility: CLINIC | Age: 43
End: 2023-01-26

## 2023-01-26 ENCOUNTER — OFFICE VISIT (OUTPATIENT)
Dept: FAMILY MEDICINE CLINIC | Facility: CLINIC | Age: 43
End: 2023-01-26

## 2023-01-26 VITALS
OXYGEN SATURATION: 99 % | HEIGHT: 67 IN | DIASTOLIC BLOOD PRESSURE: 70 MMHG | BODY MASS INDEX: 29.66 KG/M2 | TEMPERATURE: 96.9 F | HEART RATE: 88 BPM | RESPIRATION RATE: 16 BRPM | WEIGHT: 189 LBS | SYSTOLIC BLOOD PRESSURE: 122 MMHG

## 2023-01-26 DIAGNOSIS — R41.3 MEMORY LOSS: Primary | ICD-10-CM

## 2023-01-26 DIAGNOSIS — G44.229 CHRONIC TENSION-TYPE HEADACHE, NOT INTRACTABLE: ICD-10-CM

## 2023-01-26 DIAGNOSIS — R41.3 MEMORY LOSS: ICD-10-CM

## 2023-01-26 LAB
ALBUMIN SERPL BCP-MCNC: 3.9 G/DL (ref 3.5–5)
ALP SERPL-CCNC: 67 U/L (ref 46–116)
ALT SERPL W P-5'-P-CCNC: 51 U/L (ref 12–78)
ANION GAP SERPL CALCULATED.3IONS-SCNC: 5 MMOL/L (ref 4–13)
AST SERPL W P-5'-P-CCNC: 23 U/L (ref 5–45)
BASOPHILS # BLD AUTO: 0.02 THOUSANDS/ÂΜL (ref 0–0.1)
BASOPHILS NFR BLD AUTO: 0 % (ref 0–1)
BILIRUB SERPL-MCNC: 0.25 MG/DL (ref 0.2–1)
BUN SERPL-MCNC: 13 MG/DL (ref 5–25)
CALCIUM SERPL-MCNC: 9.5 MG/DL (ref 8.3–10.1)
CHLORIDE SERPL-SCNC: 105 MMOL/L (ref 96–108)
CO2 SERPL-SCNC: 28 MMOL/L (ref 21–32)
CREAT SERPL-MCNC: 0.76 MG/DL (ref 0.6–1.3)
EOSINOPHIL # BLD AUTO: 0.14 THOUSAND/ÂΜL (ref 0–0.61)
EOSINOPHIL NFR BLD AUTO: 3 % (ref 0–6)
ERYTHROCYTE [DISTWIDTH] IN BLOOD BY AUTOMATED COUNT: 13.3 % (ref 11.6–15.1)
FOLATE SERPL-MCNC: 8.6 NG/ML (ref 3.1–17.5)
GFR SERPL CREATININE-BSD FRML MDRD: 98 ML/MIN/1.73SQ M
GLUCOSE P FAST SERPL-MCNC: 89 MG/DL (ref 65–99)
HCT VFR BLD AUTO: 40.9 % (ref 34.8–46.1)
HGB BLD-MCNC: 12.4 G/DL (ref 11.5–15.4)
IMM GRANULOCYTES # BLD AUTO: 0.02 THOUSAND/UL (ref 0–0.2)
IMM GRANULOCYTES NFR BLD AUTO: 0 % (ref 0–2)
LYMPHOCYTES # BLD AUTO: 1.67 THOUSANDS/ÂΜL (ref 0.6–4.47)
LYMPHOCYTES NFR BLD AUTO: 33 % (ref 14–44)
MCH RBC QN AUTO: 27 PG (ref 26.8–34.3)
MCHC RBC AUTO-ENTMCNC: 30.3 G/DL (ref 31.4–37.4)
MCV RBC AUTO: 89 FL (ref 82–98)
MONOCYTES # BLD AUTO: 0.32 THOUSAND/ÂΜL (ref 0.17–1.22)
MONOCYTES NFR BLD AUTO: 6 % (ref 4–12)
NEUTROPHILS # BLD AUTO: 2.96 THOUSANDS/ÂΜL (ref 1.85–7.62)
NEUTS SEG NFR BLD AUTO: 58 % (ref 43–75)
NRBC BLD AUTO-RTO: 0 /100 WBCS
PLATELET # BLD AUTO: 277 THOUSANDS/UL (ref 149–390)
PMV BLD AUTO: 10.4 FL (ref 8.9–12.7)
POTASSIUM SERPL-SCNC: 4.2 MMOL/L (ref 3.5–5.3)
PROT SERPL-MCNC: 7.7 G/DL (ref 6.4–8.4)
RBC # BLD AUTO: 4.6 MILLION/UL (ref 3.81–5.12)
SODIUM SERPL-SCNC: 138 MMOL/L (ref 135–147)
VIT B12 SERPL-MCNC: 541 PG/ML (ref 100–900)
WBC # BLD AUTO: 5.13 THOUSAND/UL (ref 4.31–10.16)

## 2023-01-26 NOTE — PROGRESS NOTES
Name: Anila Feliciano      : 1980      MRN: [id number]  Encounter Provider: ANAT Alvarez  Encounter Date: 2023   Encounter department: [address] [place] [address]     1  Memory loss  Assessment & Plan:  - Blood work ordered to assess for an underlying contributing factor   - RTC in 2 weeks to review blood work, complete MOCA & possibly consider MRI of brain and neuro referral      Orders:  -     Vitamin B12; Future  -     CBC and differential; Future  -     Folate; Future  -     Comprehensive metabolic panel; Future  -     RPR; Future  -     HIV 1/2 Antigen/Antibody (4th Generation) w Reflex [facility]; Future  -     Lyme Total Antibody Profile with reflex to WB; Future    2  Chronic tension-type headache, not intractable  Assessment & Plan:  - Continue with prn tylenol   - Reviewed lifestyle management such as exercise, adequate sleep, meditation, drinking 64 oz of water daily, and following a healthy diet  Subjective      Anila Feliciano is a 38 y o  female  has a past medical history of Chronic hip pain, Disease of thyroid gland, and Osteopenia  has a past surgical history that includes Total abdominal hysterectomy w/ bilateral salpingoophorectomy (Right); Cervical biopsy w/ loop electrode excision (N/A);  section (N/A); and Left oophorectomy (Left)  She reports headaches- occurs in the generalized head, present for many years, pain is described as pressure-like  She takes tylenol prn for this which helps  She endorses adequate sleep about 6-7 hours a night  She also reports doing multiple incoherent acts  For example, at work, she will cover containers with lids that are clearly inappropriately sized  She reports frequent forgetfulness  For example, at work, they will tell her that she did something and she will deny it as she does not recall the action   However, the camera at work will show that she in fact did do said action  Symptoms have been ongoing for several years but feels as though they are worsening over the last few months  This events are not isolated to high stress situations  No significant family history for neurological conditions  Denies constitutional symptoms as per ROS  Review of Systems   Constitutional: Positive for fatigue  Negative for chills and fever  HENT: Negative for ear pain and sore throat  Eyes: Negative for pain and visual disturbance  Respiratory: Negative for cough and shortness of breath  Cardiovascular: Negative for chest pain, palpitations and leg swelling  Gastrointestinal: Negative for abdominal pain and vomiting  Genitourinary: Negative for dysuria and hematuria  Musculoskeletal: Positive for arthralgias and myalgias  Skin: Negative for color change and rash  Neurological: Positive for dizziness and headaches  Negative for tremors, seizures, syncope, facial asymmetry, speech difficulty, weakness, light-headedness and numbness  (+) forgetfulness   All other systems reviewed and are negative  Current Outpatient Medications on File Prior to Visit   Medication Sig   • cholecalciferol (VITAMIN D3) 1,000 units tablet Take 1 tablet (1,000 Units total) by mouth daily   • levothyroxine 50 mcg tablet Take 1 tablet (50 mcg total) by mouth daily in the early morning       Objective     /70 (BP Location: Left arm, Patient Position: Sitting, Cuff Size: Standard)   Pulse 88   Temp (!) 96 9 °F (36 1 °C) (Temporal)   Resp 16   Ht 5' 7" (1 702 m)   Wt 85 7 kg (189 lb)   SpO2 99%   BMI 29 60 kg/m²     Physical Exam  Vitals and nursing note reviewed  Constitutional:       Appearance: She is overweight  HENT:      Head: Normocephalic and atraumatic        Right Ear: Tympanic membrane, ear canal and external ear normal       Left Ear: Tympanic membrane, ear canal and external ear normal       Nose: Nose normal       Mouth/Throat:      Mouth: Mucous membranes are moist       Pharynx: Oropharynx is clear  Eyes:      Extraocular Movements: Extraocular movements intact  Conjunctiva/sclera: Conjunctivae normal       Pupils: Pupils are equal, round, and reactive to light  Cardiovascular:      Rate and Rhythm: Normal rate and regular rhythm  Pulses: Normal pulses  Heart sounds: Normal heart sounds  Pulmonary:      Effort: Pulmonary effort is normal       Breath sounds: Normal breath sounds  Abdominal:      General: Bowel sounds are normal       Palpations: Abdomen is soft  Tenderness: There is no abdominal tenderness  Musculoskeletal:         General: Normal range of motion  Cervical back: Normal range of motion  Skin:     General: Skin is warm and dry  Neurological:      General: No focal deficit present  Mental Status: She is alert and oriented to person, place, and time  Mental status is at baseline  Cranial Nerves: Cranial nerves 2-12 are intact  Sensory: Sensation is intact  Motor: Motor function is intact  Coordination: Coordination is intact  Gait: Gait is intact  Psychiatric:         Attention and Perception: Attention and perception normal          Mood and Affect: Mood is anxious  Behavior: Behavior normal          Thought Content:  Thought content normal          Judgment: Judgment normal        Susie Herr

## 2023-01-26 NOTE — ASSESSMENT & PLAN NOTE
- Blood work ordered to assess for an underlying contributing factor   - RTC in 2 weeks to review blood work, complete MOCA & possibly consider MRI of brain and neuro referral

## 2023-01-26 NOTE — ASSESSMENT & PLAN NOTE
- Continue with prn tylenol   - Reviewed lifestyle management such as exercise, adequate sleep, meditation, drinking 64 oz of water daily, and following a healthy diet

## 2023-01-27 LAB
B BURGDOR IGG+IGM SER-ACNC: 0.2 AI
HIV 1+2 AB+HIV1 P24 AG SERPL QL IA: NORMAL
HIV 2 AB SERPL QL IA: NORMAL
HIV1 AB SERPL QL IA: NORMAL
HIV1 P24 AG SERPL QL IA: NORMAL
RPR SER QL: NORMAL

## 2023-01-30 ENCOUNTER — TELEPHONE (OUTPATIENT)
Dept: FAMILY MEDICINE CLINIC | Facility: CLINIC | Age: 43
End: 2023-01-30

## 2023-02-09 ENCOUNTER — OFFICE VISIT (OUTPATIENT)
Dept: FAMILY MEDICINE CLINIC | Facility: CLINIC | Age: 43
End: 2023-02-09

## 2023-02-09 VITALS
DIASTOLIC BLOOD PRESSURE: 72 MMHG | TEMPERATURE: 97.6 F | BODY MASS INDEX: 30.07 KG/M2 | HEART RATE: 100 BPM | RESPIRATION RATE: 18 BRPM | SYSTOLIC BLOOD PRESSURE: 100 MMHG | WEIGHT: 191.6 LBS | OXYGEN SATURATION: 99 % | HEIGHT: 67 IN

## 2023-02-09 DIAGNOSIS — G31.84 MILD COGNITIVE IMPAIRMENT OF UNCERTAIN OR UNKNOWN ETIOLOGY: Primary | ICD-10-CM

## 2023-02-09 NOTE — PROGRESS NOTES
Name: Yo Sethi      : 1980      MRN: [id number]  Encounter Provider: ANAT Burch  Encounter Date: 2023   Encounter department: [facility]    Assessment & Plan     1  Mild cognitive impairment of uncertain or unknown etiology  Assessment & Plan:  - Unremarkable blood work  - MOCA score was 22   - Will obtain MRI of head and refer to neurology for their recommendations    -Recommend regular cognitive exercises        Orders:  -     Ambulatory Referral to Neurology; Future  -     MRI brain w wo contrast; Future; Expected date: 2023         Subjective      Yo Sethi is a 38 y o  female  has a past medical history of Chronic hip pain, Disease of thyroid gland, and Osteopenia  has a past surgical history that includes Total abdominal hysterectomy w/ bilateral salpingoophorectomy (Right); Cervical biopsy w/ loop electrode excision (N/A);  section (N/A); and Left oophorectomy (Left)        Patient presents with a follow up for memory loss  Last OV she reported:  " headaches- occurs in the generalized head, present for many years, pain is described as pressure-like  She takes tylenol prn for this which helps  She endorses adequate sleep about 6-7 hours a night  She also reports doing multiple incoherent acts  For example, at work, she will cover containers with lids that are clearly inappropriately sized  She reports frequent forgetfulness  For example, at work, they will tell her that she did something and she will deny it as she does not recall the action  However, the camera at work will show that she in fact did do said action  Symptoms have been ongoing for several years but feels as though they are worsening over the last few months  This events are not isolated to high stress situations  No significant family history for neurological conditions   Denies constitutional symptoms as per ROS "    Blood work was ordered and did not reveal significant abnormalities to explain this memory loss  She presents today to complete a MOCA test         Review of Systems   Constitutional: Negative for chills and fever  HENT: Negative for ear pain and sore throat  Eyes: Negative for pain and visual disturbance  Respiratory: Negative for cough and shortness of breath  Cardiovascular: Negative for chest pain and palpitations  Gastrointestinal: Negative for abdominal pain and vomiting  Genitourinary: Negative for dysuria and hematuria  Musculoskeletal: Negative for arthralgias and back pain  Skin: Negative for color change and rash  Neurological: Negative for seizures and syncope  (+) memory loss   Psychiatric/Behavioral: Negative  All other systems reviewed and are negative  Current Outpatient Medications on File Prior to Visit   Medication Sig   • cholecalciferol (VITAMIN D3) 1,000 units tablet Take 1 tablet (1,000 Units total) by mouth daily   • levothyroxine 50 mcg tablet Take 1 tablet (50 mcg total) by mouth daily in the early morning       Objective     /72 (BP Location: Right arm, Patient Position: Sitting, Cuff Size: Standard)   Pulse 100   Temp 97 6 °F (36 4 °C) (Temporal)   Resp 18   Ht 5' 7" (1 702 m)   Wt 86 9 kg (191 lb 9 6 oz)   SpO2 99%   BMI 30 01 kg/m²     Physical Exam  Constitutional:       Appearance: Normal appearance  She is normal weight  HENT:      Head: Normocephalic  Right Ear: Tympanic membrane, ear canal and external ear normal       Left Ear: Tympanic membrane, ear canal and external ear normal       Nose: Nose normal       Mouth/Throat:      Mouth: Mucous membranes are moist    Eyes:      General:         Right eye: No discharge  Left eye: No discharge  Cardiovascular:      Rate and Rhythm: Normal rate and regular rhythm  Pulses: Normal pulses  Heart sounds: Normal heart sounds     Pulmonary:      Effort: Pulmonary effort is normal       Breath sounds: Normal breath sounds  Abdominal:      General: Abdomen is flat  Bowel sounds are normal    Musculoskeletal:         General: Normal range of motion  Cervical back: Normal range of motion  Right lower leg: No edema  Left lower leg: No edema  Skin:     General: Skin is warm and dry  Neurological:      General: No focal deficit present  Mental Status: She is alert and oriented to person, place, and time  Psychiatric:         Mood and Affect: Mood normal          Behavior: Behavior normal          Thought Content:  Thought content normal          Judgment: Judgment normal        Yusuf Zane

## 2023-02-09 NOTE — ASSESSMENT & PLAN NOTE
- Unremarkable blood work    - MOCA score was 22   - Will obtain MRI of head and refer to neurology for their recommendations    -Recommend regular cognitive exercises

## 2023-05-19 ENCOUNTER — HOSPITAL ENCOUNTER (EMERGENCY)
Facility: HOSPITAL | Age: 43
Discharge: HOME | End: 2023-05-19
Attending: EMERGENCY MEDICINE

## 2023-05-19 VITALS
WEIGHT: 180.78 LBS | TEMPERATURE: 97.9 F | SYSTOLIC BLOOD PRESSURE: 134 MMHG | RESPIRATION RATE: 15 BRPM | BODY MASS INDEX: 28.37 KG/M2 | DIASTOLIC BLOOD PRESSURE: 87 MMHG | HEART RATE: 86 BPM | OXYGEN SATURATION: 99 % | HEIGHT: 67 IN

## 2023-05-19 DIAGNOSIS — J02.9 PHARYNGITIS: Primary | ICD-10-CM

## 2023-05-19 RX ORDER — DEXAMETHASONE 4 MG/1
10 TABLET ORAL ONCE
Status: COMPLETED | OUTPATIENT
Start: 2023-05-19 | End: 2023-05-19

## 2023-05-19 RX ORDER — AMOXICILLIN 500 MG/1
500 CAPSULE ORAL 2 TIMES DAILY
Qty: 20 CAPSULE | Refills: 0 | Status: SHIPPED | OUTPATIENT
Start: 2023-05-19 | End: 2023-05-29

## 2023-05-19 RX ORDER — KETOROLAC TROMETHAMINE 30 MG/ML
15 INJECTION, SOLUTION INTRAMUSCULAR; INTRAVENOUS ONCE
Status: COMPLETED | OUTPATIENT
Start: 2023-05-19 | End: 2023-05-19

## 2023-05-19 RX ADMIN — DEXAMETHASONE 10 MG: 4 TABLET ORAL at 11:20

## 2023-05-19 RX ADMIN — KETOROLAC TROMETHAMINE 15 MG: 30 INJECTION, SOLUTION INTRAMUSCULAR; INTRAVENOUS at 11:21

## 2023-05-19 NOTE — Clinical Note
Tamara Bella was seen and treated in our emergency department on 5/19/2023  Diagnosis:     Ruba Pugh  may return to work on return date  She may return on this date: 05/22/2023         If you have any questions or concerns, please don't hesitate to call        Graciela Turk PA-C    ______________________________           _______________          _______________  Hospital Representative                              Date                                Time

## 2023-05-19 NOTE — ED PROVIDER NOTES
History  Chief Complaint   Patient presents with   • URI     Sore throat, cough, headache, muscle aches  X 2 days     Tenzin Pacheco is a 38 y o  female with no significant past medical history presenting to the ER complaining of sore throat x3 days  Patient reports associated mild generalized headache, body aches, subjective fevers, and mild nausea  Patient denies any cough, chest pain, shortness of breath, abdominal pain, vomiting, or diarrhea  Patient reports pain is worse with swallowing  She has been attempting treatment with over-the-counter pain medication, last dose last night  Patient denies any possible chance of pregnancy  Ipad interpretor [id number]          Prior to Admission Medications   Prescriptions Last Dose Informant Patient Reported? Taking? cholecalciferol (VITAMIN D3) 1,000 units tablet   No Yes   Sig: Take 1 tablet (1,000 Units total) by mouth daily   levothyroxine 50 mcg tablet   No Yes   Sig: Take 1 tablet (50 mcg total) by mouth daily in the early morning      Facility-Administered Medications: None       Past Medical History:   Diagnosis Date   • Chronic hip pain    • Disease of thyroid gland    • Osteopenia        Past Surgical History:   Procedure Laterality Date   • CERVICAL BIOPSY  W/ LOOP ELECTRODE EXCISION N/A    •  SECTION N/A    • LEFT OOPHORECTOMY Left    • TOTAL ABDOMINAL HYSTERECTOMY W/ BILATERAL SALPINGOOPHORECTOMY Right        Family History   Problem Relation Age of Onset   • No Known Problems Mother    • No Known Problems Father    • No Known Problems Sister    • No Known Problems Daughter    • No Known Problems Maternal Grandmother    • No Known Problems Maternal Grandfather    • No Known Problems Paternal Grandmother    • No Known Problems Paternal Grandfather    • Stomach cancer Maternal Aunt      I have reviewed and agree with the history as documented      E-Cigarette/Vaping   • E-Cigarette Use Never User      E-Cigarette/Vaping Substances   • Nicotine No      Social History     Tobacco Use   • Smoking status: Never   • Smokeless tobacco: Never   Vaping Use   • Vaping Use: Never used   Substance Use Topics   • Alcohol use: Yes   • Drug use: Never       Review of Systems   Constitutional: Positive for fever  Negative for chills  HENT: Positive for sore throat  Negative for ear pain  Eyes: Negative for pain and visual disturbance  Respiratory: Negative for cough and shortness of breath  Cardiovascular: Negative for chest pain and palpitations  Gastrointestinal: Positive for nausea  Negative for abdominal pain and vomiting  Genitourinary: Negative for dysuria and hematuria  Musculoskeletal: Positive for myalgias  Negative for arthralgias and back pain  Skin: Negative for color change and rash  Neurological: Negative for dizziness, seizures, syncope, facial asymmetry and headaches  All other systems reviewed and are negative  Physical Exam  Physical Exam  Vitals and nursing note reviewed  Constitutional:       General: She is not in acute distress  Appearance: She is well-developed  She is not ill-appearing, toxic-appearing or diaphoretic  HENT:      Head: Normocephalic and atraumatic  Right Ear: Tympanic membrane normal       Left Ear: Tympanic membrane normal       Mouth/Throat:      Mouth: Mucous membranes are moist       Tongue: No lesions  Tongue does not deviate from midline  Palate: No mass and lesions  Pharynx: Uvula midline  Oropharyngeal exudate and posterior oropharyngeal erythema present  No pharyngeal swelling or uvula swelling  Tonsils: No tonsillar abscesses  Eyes:      General:         Right eye: No discharge  Left eye: No discharge  Extraocular Movements: Extraocular movements intact  Conjunctiva/sclera: Conjunctivae normal       Pupils: Pupils are equal, round, and reactive to light  Cardiovascular:      Rate and Rhythm: Normal rate and regular rhythm        Heart sounds: No murmur heard  Pulmonary:      Effort: Pulmonary effort is normal  No respiratory distress  Breath sounds: Normal breath sounds  No stridor  No wheezing, rhonchi or rales  Abdominal:      General: There is no distension  Palpations: Abdomen is soft  There is no mass  Tenderness: There is no abdominal tenderness  Musculoskeletal:         General: No swelling  Cervical back: Normal range of motion and neck supple  No edema, rigidity or tenderness  No pain with movement, spinous process tenderness or muscular tenderness  Normal range of motion  Lymphadenopathy:      Cervical: Cervical adenopathy (anterior) present  Skin:     General: Skin is warm and dry  Capillary Refill: Capillary refill takes less than 2 seconds  Neurological:      Mental Status: She is alert  Psychiatric:         Mood and Affect: Mood normal          Vital Signs  ED Triage Vitals [05/19/23 1034]   Temperature Pulse Respirations Blood Pressure SpO2   97 9 °F (36 6 °C) 86 15 134/87 99 %      Temp Source Heart Rate Source Patient Position - Orthostatic VS BP Location FiO2 (%)   Oral -- -- -- --      Pain Score       6           Vitals:    05/19/23 1034   BP: 134/87   Pulse: 86         Visual Acuity      ED Medications  Medications   ketorolac (TORADOL) injection 15 mg (15 mg Intramuscular Given 5/19/23 1121)   dexamethasone (DECADRON) tablet 10 mg (10 mg Oral Given 5/19/23 1120)       Diagnostic Studies  Results Reviewed     None                 No orders to display              Procedures  Procedures         ED Course                               SBIRT 22yo+    Flowsheet Row Most Recent Value   Initial Alcohol Screen: US AUDIT-C     1  How often do you have a drink containing alcohol? 0 Filed at: 05/19/2023 1042   2  How many drinks containing alcohol do you have on a typical day you are drinking? 0 Filed at: 05/19/2023 1042   3b  FEMALE Any Age, or MALE 65+:  How often do you have 4 or more drinks on one occassion? 0 Filed at: 05/19/2023 1042   Audit-C Score 0 Filed at: 05/19/2023 1042   KENYATTA: How many times in the past year have you    Used an illegal drug or used a prescription medication for non-medical reasons? Never Filed at: 05/19/2023 1042                    Medical Decision Making  Larry Ham is a 38 y o  female with no significant past medical history presenting to the ER complaining of sore throat x3 days with associated mild generalized headache, body aches, subjective fever, and mild nausea  On exam patient is well-appearing and in no acute distress  Vital signs are within normal limits  Physical examination reveals pharyngeal erythema with minimal exudates  No tonsillar swelling or uvular deviation  Patient is tolerating secretions and talking without any difficulty or obvious voice change  There is anterior cervical adenopathy  Patient has full range of motion of neck with no pain with movement  Examination is otherwise unremarkable  Suspect pharyngitis likely strep due to high Centor score  Will treat with amoxicillin  Will order Toradol and Decadron  Extensively discussed with patient likely etiology of sore throat and appropriate supportive care at home  Discussed appropriate and safe use of medicine  Advise close follow-up with PCP for reevaluation  Advised strict return precautions to the ER  Patient is understanding and agreeable with plan  Patient has remained stable throughout stay in ER and is stable for discharge  Pharyngitis: acute illness or injury  Risk  OTC drugs  Prescription drug management            Disposition  Final diagnoses:   Pharyngitis     Time reflects when diagnosis was documented in both MDM as applicable and the Disposition within this note     Time User Action Codes Description Comment    5/19/2023 11:08 AM Mariana Cadena Add [J02 9] Pharyngitis       ED Disposition     ED Disposition   Discharge    Condition   Stable    Date/Time   Fri May 19, 2023 11:08 AM    Comment   Brayan Sims discharge to home/self care  Follow-up Information     Follow up With Specialties Details Why Contact Info Additional Information    Merlin Ishihara, CRNP Nurse Practitioner, Family Medicine   [address] [facility] [id number]  [address] [facility] Emergency Department Emergency Medicine  If symptoms worsen [facility] [phone] [address] [facility] Emergency Department, [address] [place] [address], [id number]          Patient's Medications   Discharge Prescriptions    AMOXICILLIN (AMOXIL) 500 MG CAPSULE    Take 1 capsule (500 mg total) by mouth 2 (two) times a day for 10 days       Start Date: 5/19/2023 End Date: 5/29/2023       Order Dose: 500 mg       Quantity: 20 capsule    Refills: 0       No discharge procedures on file      PDMP Review     None          ED Provider  Electronically Signed by           Toma Biggs PA-C  05/19/23 1124

## 2023-06-13 NOTE — PROGRESS NOTES
[address] [facility]    NAME: Naomi Cuenca  AGE: 38 y o  SEX: female  : 1980     DATE: 2023     Assessment and Plan:     Problem List Items Addressed This Visit        Endocrine    Other specified hypothyroidism     Lab Results   Component Value Date    [id number] 4 080 2022     - Check TSH  - Continue synthroid 50 mcg daily         Relevant Medications    levothyroxine 50 mcg tablet    Other Relevant Orders    TSH, 3rd generation with Free T4 reflex       Other    's permit PE (physical examination)     Here today for a 's permit physical   Denies any history of seizures, neuro dx,  neuropsych dx, syncope, cardiac issues, htn, drug/ alcohol abuse, diabetes, physical disability and any conditions that cause a lapse of consciousness               Other Visit Diagnoses     Annual physical exam    -  Primary    Screening-pulmonary TB        Relevant Orders    XR chest pa & lateral          Immunizations and preventive care screenings were discussed with patient today  Appropriate education was printed on patient's after visit summary  Counseling:  Alcohol/drug use: discussed moderation in alcohol intake, the recommendations for healthy alcohol use, and avoidance of illicit drug use  Dental Health: discussed importance of regular tooth brushing, flossing, and dental visits  Injury prevention: discussed safety/seat belts, safety helmets, smoke detectors, carbon dioxide detectors, and smoking near bedding or upholstery  Sexual health: discussed sexually transmitted diseases, partner selection, use of condoms, avoidance of unintended pregnancy, and contraceptive alternatives  Exercise: the importance of regular exercise/physical activity was discussed  Recommend exercise 3-5 times per week for at least 30 minutes  BMI Counseling: There is no height or weight on file to calculate BMI  The BMI is above normal  Nutrition recommendations include decreasing portion sizes, encouraging healthy choices of fruits and vegetables, decreasing fast food intake, consuming healthier snacks, limiting drinks that contain sugar, moderation in carbohydrate intake, increasing intake of lean protein, reducing intake of saturated and trans fat and reducing intake of cholesterol  Exercise recommendations include moderate physical activity 150 minutes/week  No pharmacotherapy was ordered  Rationale for BMI follow-up plan is due to patient being overweight or obese  Return in about 6 months (around 12/14/2023) for Recheck hypothyroidism  Chief Complaint:     Chief Complaint   Patient presents with   • Physical Exam      History of Present Illness:     Adult Annual Physical   Patient here for a comprehensive physical exam  The patient reports no problems  She needs TB screening done and needs a CXR given that she was previously treated for TB  Diet and Physical Activity  · Diet/Nutrition: well balanced diet  · Exercise: no formal exercise  Depression Screening  PHQ-2/9 Depression Screening         General Health  · Sleep: gets 7-8 hours of sleep on average  · Hearing: normal - bilateral   · Vision: most recent eye exam >1 year ago and wears glasses  · Dental: regular dental visits, brushes teeth twice daily and does not floss  /GYN Health  · Patient is: post menopausal after hysterectomy     Review of Systems:     Review of Systems   Constitutional: Negative for chills and fever  HENT: Negative for ear pain and sore throat  Eyes: Negative for pain and visual disturbance  Respiratory: Negative for cough and shortness of breath  Cardiovascular: Negative for chest pain and palpitations  Gastrointestinal: Negative for abdominal pain and vomiting  Genitourinary: Negative for dysuria and hematuria  Musculoskeletal: Negative for arthralgias and back pain     Skin: Negative for color change and rash  Neurological: Negative for seizures and syncope  All other systems reviewed and are negative  Past Medical History:     Past Medical History:   Diagnosis Date   • Chronic hip pain    • Disease of thyroid gland    • Osteopenia       Past Surgical History:     Past Surgical History:   Procedure Laterality Date   • CERVICAL BIOPSY  W/ LOOP ELECTRODE EXCISION N/A    •  SECTION N/A    • LEFT OOPHORECTOMY Left    • TOTAL ABDOMINAL HYSTERECTOMY W/ BILATERAL SALPINGOOPHORECTOMY Right       Social History:     Social History     Socioeconomic History   • Marital status: Single     Spouse name: None   • Number of children: None   • Years of education: None   • Highest education level: None   Occupational History   • None   Tobacco Use   • Smoking status: Never   • Smokeless tobacco: Never   Vaping Use   • Vaping Use: Never used   Substance and Sexual Activity   • Alcohol use: Yes   • Drug use: Never   • Sexual activity: Yes     Partners: Male   Other Topics Concern   • None   Social History Narrative   • None     Social Determinants of Health     Financial Resource Strain: Medium Risk (2022)    Overall Financial Resource Strain (CARDIA)    • Difficulty of Paying Living Expenses: Somewhat hard   Food Insecurity: No Food Insecurity (2022)    Hunger Vital Sign    • Worried About Running Out of Food in the Last Year: Never true    • Ran Out of Food in the Last Year: Never true   Transportation Needs: No Transportation Needs (2022)    PRAPARE - Transportation    • Lack of Transportation (Medical): No    • Lack of Transportation (Non-Medical):  No   Physical Activity: Not on file   Stress: Not on file   Social Connections: Not on file   Intimate Partner Violence: Not on file   Housing Stability: Low Risk  (2021)    Housing Stability Vital Sign    • Unable to Pay for Housing in the Last Year: No    • Number of Places Lived in the Last Year: 1    • Unstable Housing in the "Last Year: No      Family History:     Family History   Problem Relation Age of Onset   • No Known Problems Mother    • No Known Problems Father    • No Known Problems Sister    • No Known Problems Daughter    • No Known Problems Maternal Grandmother    • No Known Problems Maternal Grandfather    • No Known Problems Paternal Grandmother    • No Known Problems Paternal Grandfather    • Stomach cancer Maternal Aunt       Current Medications:     Current Outpatient Medications   Medication Sig Dispense Refill   • levothyroxine 50 mcg tablet Take 1 tablet (50 mcg total) by mouth daily in the early morning 90 tablet 1   • cholecalciferol (VITAMIN D3) 1,000 units tablet Take 1 tablet (1,000 Units total) by mouth daily 90 tablet 1     No current facility-administered medications for this visit  Allergies:     No Known Allergies   Physical Exam:     /70 (BP Location: Right arm, Patient Position: Sitting, Cuff Size: Large)   Pulse 83   Temp 98 3 °F (36 8 °C) (Temporal)   Resp 16   Ht 5' 7\" (1 702 m)   Wt 83 9 kg (185 lb)   SpO2 99%   BMI 28 98 kg/m²     Physical Exam  Vitals and nursing note reviewed  Constitutional:       General: She is not in acute distress  Appearance: Normal appearance  She is well-developed  HENT:      Head: Normocephalic and atraumatic  Right Ear: External ear normal       Left Ear: External ear normal       Nose: Nose normal    Eyes:      Conjunctiva/sclera: Conjunctivae normal    Cardiovascular:      Rate and Rhythm: Normal rate and regular rhythm  Pulses: Normal pulses  Heart sounds: Normal heart sounds  No murmur heard  Pulmonary:      Effort: Pulmonary effort is normal  No respiratory distress  Breath sounds: Normal breath sounds  Abdominal:      Palpations: Abdomen is soft  Tenderness: There is no abdominal tenderness  Musculoskeletal:         General: Normal range of motion  Cervical back: Normal range of motion and neck supple   " Skin:     General: Skin is warm and dry  Neurological:      Mental Status: She is alert and oriented to person, place, and time  Mental status is at baseline  Psychiatric:         Mood and Affect: Mood normal          Behavior: Behavior normal          Thought Content:  Thought content normal          Judgment: Judgment normal         Vision Screening    Right eye Left eye Both eyes   Without correction      With correction 20/20 20/20 20/20     Barbara Hunter, [address] [facility]

## 2023-06-14 ENCOUNTER — OFFICE VISIT (OUTPATIENT)
Dept: FAMILY MEDICINE CLINIC | Facility: CLINIC | Age: 43
End: 2023-06-14

## 2023-06-14 VITALS
WEIGHT: 185 LBS | DIASTOLIC BLOOD PRESSURE: 70 MMHG | RESPIRATION RATE: 16 BRPM | BODY MASS INDEX: 29.03 KG/M2 | HEIGHT: 67 IN | HEART RATE: 83 BPM | SYSTOLIC BLOOD PRESSURE: 110 MMHG | OXYGEN SATURATION: 99 % | TEMPERATURE: 98.3 F

## 2023-06-14 DIAGNOSIS — E03.8 OTHER SPECIFIED HYPOTHYROIDISM: ICD-10-CM

## 2023-06-14 DIAGNOSIS — Z02.4 DRIVER'S PERMIT PE (PHYSICAL EXAMINATION): ICD-10-CM

## 2023-06-14 DIAGNOSIS — Z11.1 SCREENING-PULMONARY TB: ICD-10-CM

## 2023-06-14 DIAGNOSIS — Z00.00 ANNUAL PHYSICAL EXAM: Primary | ICD-10-CM

## 2023-06-14 PROCEDURE — 99213 OFFICE O/P EST LOW 20 MIN: CPT

## 2023-06-14 PROCEDURE — 99396 PREV VISIT EST AGE 40-64: CPT

## 2023-06-14 RX ORDER — LEVOTHYROXINE SODIUM 0.05 MG/1
50 TABLET ORAL
Qty: 90 TABLET | Refills: 1 | Status: SHIPPED | OUTPATIENT
Start: 2023-06-14

## 2023-06-14 NOTE — ASSESSMENT & PLAN NOTE
Lab Results   Component Value Date    [id number] 4 080 12/16/2022     - Check TSH  - Continue synthroid 50 mcg daily

## 2023-06-14 NOTE — ASSESSMENT & PLAN NOTE
Here today for a 's permit physical   Denies any history of seizures, neuro dx,  neuropsych dx, syncope, cardiac issues, htn, drug/ alcohol abuse, diabetes, physical disability and any conditions that cause a lapse of consciousness  Olimpia Harrington

## 2023-06-29 ENCOUNTER — HOSPITAL ENCOUNTER (OUTPATIENT)
Dept: MAMMOGRAPHY | Facility: CLINIC | Age: 43
Discharge: HOME | End: 2023-06-29

## 2023-07-03 DIAGNOSIS — E55.9 VITAMIN D DEFICIENCY: ICD-10-CM

## 2023-07-03 RX ORDER — RESVER/WINE/BFL/GRPSD/PC/C/POM 200MG-60MG
CAPSULE ORAL
Qty: 90 TABLET | Refills: 1 | Status: SHIPPED | OUTPATIENT
Start: 2023-07-03

## 2023-08-13 PROBLEM — Z02.4 DRIVER'S PERMIT PE (PHYSICAL EXAMINATION): Status: RESOLVED | Noted: 2023-06-14 | Resolved: 2023-08-13

## 2023-09-19 ENCOUNTER — HOSPITAL ENCOUNTER (OUTPATIENT)
Dept: MAMMOGRAPHY | Facility: CLINIC | Age: 43
Discharge: HOME | End: 2023-09-19
Payer: COMMERCIAL

## 2023-09-19 DIAGNOSIS — R92.8 ABNORMAL MAMMOGRAM: ICD-10-CM

## 2023-09-19 PROCEDURE — 76642 ULTRASOUND BREAST LIMITED: CPT

## 2023-10-10 ENCOUNTER — TELEPHONE (OUTPATIENT)
Dept: FAMILY MEDICINE CLINIC | Facility: CLINIC | Age: 43
End: 2023-10-10

## 2023-10-10 NOTE — TELEPHONE ENCOUNTER
Yes, good afternoon. I have spoken gamaliel Capone and I need you to call me back. Please, with I need an appointment with the gynecologist you can call me and there [phone] my date of birth of [id number]. Thank you      Provided patient gyn phone number.

## 2023-10-12 ENCOUNTER — OFFICE VISIT (OUTPATIENT)
Dept: OBGYN CLINIC | Facility: CLINIC | Age: 43
End: 2023-10-12

## 2023-10-12 VITALS
SYSTOLIC BLOOD PRESSURE: 128 MMHG | HEIGHT: 67 IN | DIASTOLIC BLOOD PRESSURE: 87 MMHG | BODY MASS INDEX: 28.88 KG/M2 | WEIGHT: 184 LBS | HEART RATE: 65 BPM

## 2023-10-12 DIAGNOSIS — Z78.0 POSTMENOPAUSAL ESTROGEN DEFICIENCY: ICD-10-CM

## 2023-10-12 DIAGNOSIS — N76.0 BV (BACTERIAL VAGINOSIS): ICD-10-CM

## 2023-10-12 DIAGNOSIS — B96.89 BV (BACTERIAL VAGINOSIS): ICD-10-CM

## 2023-10-12 DIAGNOSIS — F32.89 POSTMENOPAUSAL DEPRESSION: ICD-10-CM

## 2023-10-12 DIAGNOSIS — N89.8 VAGINAL DISCHARGE: Primary | ICD-10-CM

## 2023-10-12 PROBLEM — B37.31 CANDIDIASIS OF VAGINA: Status: RESOLVED | Noted: 2022-05-17 | Resolved: 2023-10-12

## 2023-10-12 PROCEDURE — 99213 OFFICE O/P EST LOW 20 MIN: CPT | Performed by: OBSTETRICS & GYNECOLOGY

## 2023-10-12 PROCEDURE — 87210 SMEAR WET MOUNT SALINE/INK: CPT | Performed by: OBSTETRICS & GYNECOLOGY

## 2023-10-12 RX ORDER — METRONIDAZOLE 500 MG/1
500 TABLET ORAL EVERY 12 HOURS SCHEDULED
Qty: 14 TABLET | Refills: 0 | Status: SHIPPED | OUTPATIENT
Start: 2023-10-12 | End: 2023-10-19

## 2023-10-12 RX ORDER — ESCITALOPRAM OXALATE 10 MG/1
10 TABLET ORAL DAILY
Qty: 30 TABLET | Refills: 11 | Status: SHIPPED | OUTPATIENT
Start: 2023-10-12

## 2023-10-12 NOTE — PROGRESS NOTES
PROBLEM GYNECOLOGICAL VISIT    Claudette Jude is a 38 y.o. female who presents today with complaint of vaginal discharge. Her general medical history has been reviewed and she reports it as follows:    Past Medical History:   Diagnosis Date    Chronic hip pain     Disease of thyroid gland     Osteopenia      Past Surgical History:   Procedure Laterality Date    CERVICAL BIOPSY  W/ LOOP ELECTRODE EXCISION N/A      SECTION N/A     LEFT OOPHORECTOMY Left     TOTAL ABDOMINAL HYSTERECTOMY W/ BILATERAL SALPINGOOPHORECTOMY Right      OB History          4    Para   4    Term   4            AB        Living             SAB        IAB        Ectopic        Multiple        Live Births                   Social History     Tobacco Use    Smoking status: Never    Smokeless tobacco: Never   Vaping Use    Vaping Use: Never used   Substance Use Topics    Alcohol use: Yes     Comment: ocassianly    Drug use: Never     Social History     Substance and Sexual Activity   Sexual Activity Not Currently    Partners: Male       Current Outpatient Medications   Medication Instructions    D-1000 Extra Strength 25 MCG (1000 UT) tablet TAKE 1 TABLET BY MOUTH EVERY DAY    levothyroxine 50 mcg, Oral, Daily (early morning)       History of Present Illness:   Patient presents with c/o vaginal discharge and requesting information on vaginal rejuvenation. Patient states had a hysterectomy with removal of her ovaries and things have never been the same. Patient also states that sex is not the same and she begins to cry. Review of Systems:  Review of Systems   Genitourinary:  Positive for vaginal discharge. All other systems reviewed and are negative. Physical Exam:  /87   Pulse 65   Ht 5' 7" (1.702 m)   Wt 83.5 kg (184 lb)   BMI 28.82 kg/m²   Physical Exam  Constitutional:       Appearance: Normal appearance. Genitourinary:      Bladder and urethral meatus normal.      No lesions in the vagina. Right Labia: No rash, tenderness or lesions. Left Labia: No tenderness, lesions or rash. Vaginal cuff intact. Vaginal discharge present. Cervix is absent. No urethral tenderness or mass present. Neurological:      Mental Status: She is alert. Vitals reviewed. Discussion:  Discuss with patient recommend estrogen which will help her since she had premature menopause due to surgery and lexapro for her depression. Patient agrees with above. Assessment:   1. BV   2. Postmenopausal estrogen deficiency   3. Postmenopausal depression    Plan:   1. Flagyl/Premarin/Lexapro escribed   2. Return to office 6wks. Reviewed with patient that test results [facility] immediately, but that they will not necessarily be reviewed by me immediately. Explained that I will review results at my earliest opportunity and contact patient appropriately.

## 2023-10-16 NOTE — PATIENT INSTRUCTIONS
Be por nance confianza en nuestro equipo. Danne Kelechi y agradecemos marta comentarios. Si recibe more encuesta nuestra, tómese unos momentos para informarnos cómo estamos.    Sinceramente,  [facility], DO

## 2023-11-03 DIAGNOSIS — F32.89 POSTMENOPAUSAL DEPRESSION: ICD-10-CM

## 2023-11-22 ENCOUNTER — TELEPHONE (OUTPATIENT)
Dept: OBGYN CLINIC | Facility: CLINIC | Age: 43
End: 2023-11-22

## 2023-11-27 DIAGNOSIS — F32.89 POSTMENOPAUSAL DEPRESSION: ICD-10-CM

## 2023-11-27 RX ORDER — ESCITALOPRAM OXALATE 10 MG/1
TABLET ORAL
Qty: 90 TABLET | Refills: 4 | Status: SHIPPED | OUTPATIENT
Start: 2023-11-27

## 2023-11-27 RX ORDER — ESCITALOPRAM OXALATE 10 MG/1
10 TABLET ORAL DAILY
Qty: 90 TABLET | Refills: 2 | Status: SHIPPED | OUTPATIENT
Start: 2023-11-27 | End: 2024-02-25

## 2023-12-21 ENCOUNTER — HOSPITAL ENCOUNTER (OUTPATIENT)
Dept: MAMMOGRAPHY | Facility: CLINIC | Age: 43
End: 2023-12-21
Payer: COMMERCIAL

## 2023-12-21 VITALS — HEIGHT: 67 IN | BODY MASS INDEX: 28.88 KG/M2 | WEIGHT: 184 LBS

## 2023-12-21 DIAGNOSIS — R92.8 ABNORMAL MAMMOGRAM: ICD-10-CM

## 2023-12-21 PROCEDURE — 76642 ULTRASOUND BREAST LIMITED: CPT

## 2023-12-21 PROCEDURE — G0279 TOMOSYNTHESIS, MAMMO: HCPCS

## 2023-12-21 PROCEDURE — 77066 DX MAMMO INCL CAD BI: CPT

## 2024-01-25 ENCOUNTER — TELEPHONE (OUTPATIENT)
Dept: OBGYN CLINIC | Facility: CLINIC | Age: 44
End: 2024-01-25

## 2024-02-13 ENCOUNTER — TELEMEDICINE (OUTPATIENT)
Dept: FAMILY MEDICINE CLINIC | Facility: CLINIC | Age: 44
End: 2024-02-13

## 2024-02-13 DIAGNOSIS — Z13.220 SCREENING CHOLESTEROL LEVEL: ICD-10-CM

## 2024-02-13 DIAGNOSIS — F32.89 POSTMENOPAUSAL DEPRESSION: Primary | ICD-10-CM

## 2024-02-13 DIAGNOSIS — E03.8 OTHER SPECIFIED HYPOTHYROIDISM: ICD-10-CM

## 2024-02-13 DIAGNOSIS — F32.A FATIGUE DUE TO DEPRESSION: ICD-10-CM

## 2024-02-13 DIAGNOSIS — Z13.1 DIABETES MELLITUS SCREENING: ICD-10-CM

## 2024-02-13 DIAGNOSIS — R53.83 FATIGUE DUE TO DEPRESSION: ICD-10-CM

## 2024-02-13 DIAGNOSIS — E55.9 VITAMIN D DEFICIENCY: ICD-10-CM

## 2024-02-13 PROCEDURE — 99213 OFFICE O/P EST LOW 20 MIN: CPT

## 2024-02-13 RX ORDER — LEVOTHYROXINE SODIUM 0.05 MG/1
50 TABLET ORAL
Qty: 90 TABLET | Refills: 1 | Status: SHIPPED | OUTPATIENT
Start: 2024-02-13

## 2024-02-13 RX ORDER — FLUOXETINE 10 MG/1
10 CAPSULE ORAL DAILY
Qty: 60 CAPSULE | Refills: 0 | Status: SHIPPED | OUTPATIENT
Start: 2024-02-13

## 2024-02-13 NOTE — ASSESSMENT & PLAN NOTE
Lab Results   Component Value Date    OTR5OCRVGOVS 4.080 12/16/2022     Check TSH. Continue levothyroxine 50 mcg daily

## 2024-02-13 NOTE — ASSESSMENT & PLAN NOTE
PHQ-2/9 Depression Screening    Little interest or pleasure in doing things: 1 - several days  Feeling down, depressed, or hopeless: 1 - several days  PHQ-2 Score: 2  PHQ-2 Interpretation: Negative depression screen     Denies SI/HI   Recommend CBT. - Reviewed lifestyle management such as exercise, meditation, drinking 64 oz of water daily, and following a healthy diet.  Trial prozac.

## 2024-02-13 NOTE — PROGRESS NOTES
Virtual Regular Visit    Verification of patient location:    Patient is located at Home in the following state in which I hold an active license [place]      Assessment/Plan:    Problem List Items Addressed This Visit       Other specified hypothyroidism     Lab Results   Component Value Date    NER0NOXAOYDM 4.080 12/16/2022     Check TSH. Continue levothyroxine 50 mcg daily         Relevant Medications    levothyroxine 50 mcg tablet    Other Relevant Orders    TSH, 3rd generation with Free T4 reflex    Vitamin D deficiency    Relevant Orders    Vitamin D 25 hydroxy    Postmenopausal depression - Primary     PHQ-2/9 Depression Screening    Little interest or pleasure in doing things: 1 - several days  Feeling down, depressed, or hopeless: 1 - several days  PHQ-2 Score: 2  PHQ-2 Interpretation: Negative depression screen     Denies SI/HI   Recommend CBT. - Reviewed lifestyle management such as exercise, meditation, drinking 64 oz of water daily, and following a healthy diet.  Trial prozac.          Relevant Medications    FLUoxetine (PROzac) 10 mg capsule     Other Visit Diagnoses       Fatigue due to depression        Relevant Medications    FLUoxetine (PROzac) 10 mg capsule    Other Relevant Orders    Comprehensive metabolic panel    CBC and differential    Vitamin D 25 hydroxy    TSH, 3rd generation with Free T4 reflex    Lipid panel    Chronic Hepatitis Panel    HIV 1/2 AG/AB w Reflex [facility] for 2 yr old and above    C-reactive protein    Screening cholesterol level        Relevant Orders    CBC and differential    Lipid panel    Diabetes mellitus screening        Relevant Orders    Hemoglobin A1C            BMI Counseling: There is no height or weight on file to calculate BMI. The BMI is above normal. Nutrition recommendations include decreasing portion sizes, encouraging healthy choices of fruits and vegetables, decreasing fast food intake, consuming healthier snacks, limiting drinks that contain sugar, moderation  in carbohydrate intake, increasing intake of lean protein, reducing intake of saturated and trans fat and reducing intake of cholesterol. Exercise recommendations include moderate physical activity 150 minutes/week. No pharmacotherapy was ordered. Rationale for BMI follow-up plan is due to patient being overweight or obese.         Reason for visit is   Chief Complaint   Patient presents with    Virtual Regular Visit          Encounter provider ANAT Blank    Provider located at [address] [address] [phone]      Recent Visits  No visits were found meeting these conditions.  Showing recent visits within past 7 days and meeting all other requirements  Today's Visits  Date Type Provider Dept   24 Telemedicine [facility]   Showing today's visits and meeting all other requirements  Future Appointments  No visits were found meeting these conditions.  Showing future appointments within next 150 days and meeting all other requirements       The patient was identified by name and date of birth. Brayan Sims was informed that this is a telemedicine visit and that the visit is being conducted through the [employer] Teams platform. She agrees to proceed..  My office door was closed. No one else was in the room.  She acknowledged consent and understanding of privacy and security of the video platform. The patient has agreed to participate and understands they can discontinue the visit at any time.    Patient is aware this is a billable service.     Subjective      Brayan Sims is a 43 y.o. female  has a past medical history of Chronic hip pain, Disease of thyroid gland, and Osteopenia.  has a past surgical history that includes Total abdominal hysterectomy w/ bilateral salpingoophorectomy (Right); Cervical biopsy w/ loop electrode excision (N/A);  section (N/A); and  Left oophorectomy (Left).    She presents today for a hypothyroidism follow-up. She endorses generalized fatigue for several months. She was dx with postmenopausal depression in November and still feels as though she is struggling. She was rx lexapro but does not like it because it makes her sleepy. She also feels as though the depression is likely contributing to her fatigue.         Past Medical History:   Diagnosis Date    Chronic hip pain     Disease of thyroid gland     Osteopenia        Past Surgical History:   Procedure Laterality Date    CERVICAL BIOPSY  W/ LOOP ELECTRODE EXCISION N/A      SECTION N/A     LEFT OOPHORECTOMY Left     TOTAL ABDOMINAL HYSTERECTOMY W/ BILATERAL SALPINGOOPHORECTOMY Right        Current Outpatient Medications   Medication Sig Dispense Refill    FLUoxetine (PROzac) 10 mg capsule Take 1 capsule (10 mg total) by mouth daily 60 capsule 0    levothyroxine 50 mcg tablet Take 1 tablet (50 mcg total) by mouth daily in the early morning 90 tablet 1    conjugated estrogens (Premarin) 0.625 mg tablet Take 1 tablet (0.625 mg total) by mouth daily Take daily for 21 days then do not take for 7 days. 30 tablet 11    D-1000 Extra Strength 25 MCG (1000 UT) tablet TAKE 1 TABLET BY MOUTH EVERY DAY 90 tablet 1     No current facility-administered medications for this visit.        No Known Allergies    Review of Systems   Constitutional:  Positive for fatigue. Negative for chills and fever.   HENT:  Negative for ear pain and sore throat.    Eyes:  Negative for pain and visual disturbance.   Respiratory:  Negative for cough and shortness of breath.    Cardiovascular:  Negative for chest pain and palpitations.   Gastrointestinal:  Negative for abdominal pain and vomiting.   Genitourinary:  Negative for dysuria and hematuria.   Musculoskeletal:  Negative for arthralgias and back pain.   Skin:  Negative for color change and rash.   Neurological:  Negative for seizures and syncope.   All other  systems reviewed and are negative.      Video Exam    There were no vitals filed for this visit.    Physical Exam  Constitutional:       General: She is awake.      Appearance: Normal appearance.   Neurological:      Mental Status: She is alert and oriented to person, place, and time. Mental status is at baseline.   Psychiatric:         Mood and Affect: Mood normal.         Behavior: Behavior normal. Behavior is cooperative.         Thought Content: Thought content normal.         Judgment: Judgment normal.          Visit Time  Total Visit Duration: 15

## 2024-02-15 ENCOUNTER — APPOINTMENT (OUTPATIENT)
Dept: LAB | Facility: CLINIC | Age: 44
End: 2024-02-15
Payer: COMMERCIAL

## 2024-02-15 ENCOUNTER — TELEPHONE (OUTPATIENT)
Dept: FAMILY MEDICINE CLINIC | Facility: CLINIC | Age: 44
End: 2024-02-15

## 2024-02-15 ENCOUNTER — OFFICE VISIT (OUTPATIENT)
Dept: OBGYN CLINIC | Facility: CLINIC | Age: 44
End: 2024-02-15

## 2024-02-15 VITALS
HEIGHT: 67 IN | HEART RATE: 81 BPM | WEIGHT: 181.4 LBS | DIASTOLIC BLOOD PRESSURE: 79 MMHG | BODY MASS INDEX: 28.47 KG/M2 | SYSTOLIC BLOOD PRESSURE: 120 MMHG

## 2024-02-15 DIAGNOSIS — F32.A FATIGUE DUE TO DEPRESSION: ICD-10-CM

## 2024-02-15 DIAGNOSIS — Z78.0 POSTMENOPAUSAL ESTROGEN DEFICIENCY: Primary | ICD-10-CM

## 2024-02-15 DIAGNOSIS — R53.83 FATIGUE DUE TO DEPRESSION: ICD-10-CM

## 2024-02-15 DIAGNOSIS — E03.8 OTHER SPECIFIED HYPOTHYROIDISM: ICD-10-CM

## 2024-02-15 DIAGNOSIS — Z13.220 SCREENING CHOLESTEROL LEVEL: ICD-10-CM

## 2024-02-15 DIAGNOSIS — Z13.1 DIABETES MELLITUS SCREENING: ICD-10-CM

## 2024-02-15 DIAGNOSIS — E55.9 VITAMIN D DEFICIENCY: ICD-10-CM

## 2024-02-15 LAB
25(OH)D3 SERPL-MCNC: 36.4 NG/ML (ref 30–100)
ALBUMIN SERPL BCP-MCNC: 4.4 G/DL (ref 3.5–5)
ALP SERPL-CCNC: 58 U/L (ref 34–104)
ALT SERPL W P-5'-P-CCNC: 23 U/L (ref 7–52)
ANION GAP SERPL CALCULATED.3IONS-SCNC: 9 MMOL/L
AST SERPL W P-5'-P-CCNC: 21 U/L (ref 13–39)
BASOPHILS # BLD AUTO: 0.02 THOUSANDS/ÂΜL (ref 0–0.1)
BASOPHILS NFR BLD AUTO: 0 % (ref 0–1)
BILIRUB SERPL-MCNC: 0.42 MG/DL (ref 0.2–1)
BUN SERPL-MCNC: 10 MG/DL (ref 5–25)
CALCIUM SERPL-MCNC: 9.3 MG/DL (ref 8.4–10.2)
CHLORIDE SERPL-SCNC: 99 MMOL/L (ref 96–108)
CHOLEST SERPL-MCNC: 247 MG/DL
CO2 SERPL-SCNC: 29 MMOL/L (ref 21–32)
CREAT SERPL-MCNC: 0.78 MG/DL (ref 0.6–1.3)
CRP SERPL QL: 14.8 MG/L
EOSINOPHIL # BLD AUTO: 0.15 THOUSAND/ÂΜL (ref 0–0.61)
EOSINOPHIL NFR BLD AUTO: 2 % (ref 0–6)
ERYTHROCYTE [DISTWIDTH] IN BLOOD BY AUTOMATED COUNT: 12.7 % (ref 11.6–15.1)
EST. AVERAGE GLUCOSE BLD GHB EST-MCNC: 123 MG/DL
GFR SERPL CREATININE-BSD FRML MDRD: 94 ML/MIN/1.73SQ M
GLUCOSE P FAST SERPL-MCNC: 92 MG/DL (ref 65–99)
HBA1C MFR BLD: 5.9 %
HCT VFR BLD AUTO: 43 % (ref 34.8–46.1)
HDLC SERPL-MCNC: 92 MG/DL
HGB BLD-MCNC: 13.6 G/DL (ref 11.5–15.4)
IMM GRANULOCYTES # BLD AUTO: 0.02 THOUSAND/UL (ref 0–0.2)
IMM GRANULOCYTES NFR BLD AUTO: 0 % (ref 0–2)
LDLC SERPL CALC-MCNC: 138 MG/DL (ref 0–100)
LYMPHOCYTES # BLD AUTO: 1.91 THOUSANDS/ÂΜL (ref 0.6–4.47)
LYMPHOCYTES NFR BLD AUTO: 31 % (ref 14–44)
MCH RBC QN AUTO: 28.2 PG (ref 26.8–34.3)
MCHC RBC AUTO-ENTMCNC: 31.6 G/DL (ref 31.4–37.4)
MCV RBC AUTO: 89 FL (ref 82–98)
MONOCYTES # BLD AUTO: 0.36 THOUSAND/ÂΜL (ref 0.17–1.22)
MONOCYTES NFR BLD AUTO: 6 % (ref 4–12)
NEUTROPHILS # BLD AUTO: 3.74 THOUSANDS/ÂΜL (ref 1.85–7.62)
NEUTS SEG NFR BLD AUTO: 61 % (ref 43–75)
NONHDLC SERPL-MCNC: 155 MG/DL
NRBC BLD AUTO-RTO: 0 /100 WBCS
PLATELET # BLD AUTO: 264 THOUSANDS/UL (ref 149–390)
PMV BLD AUTO: 10 FL (ref 8.9–12.7)
POTASSIUM SERPL-SCNC: 3.8 MMOL/L (ref 3.5–5.3)
PROT SERPL-MCNC: 7.2 G/DL (ref 6.4–8.4)
RBC # BLD AUTO: 4.82 MILLION/UL (ref 3.81–5.12)
SODIUM SERPL-SCNC: 137 MMOL/L (ref 135–147)
TRIGL SERPL-MCNC: 84 MG/DL
TSH SERPL DL<=0.05 MIU/L-ACNC: 3.91 UIU/ML (ref 0.45–4.5)
WBC # BLD AUTO: 6.2 THOUSAND/UL (ref 4.31–10.16)

## 2024-02-15 PROCEDURE — 36415 COLL VENOUS BLD VENIPUNCTURE: CPT

## 2024-02-15 PROCEDURE — 99213 OFFICE O/P EST LOW 20 MIN: CPT | Performed by: OBSTETRICS & GYNECOLOGY

## 2024-02-15 PROCEDURE — 80061 LIPID PANEL: CPT

## 2024-02-15 PROCEDURE — 86705 HEP B CORE ANTIBODY IGM: CPT

## 2024-02-15 PROCEDURE — 86704 HEP B CORE ANTIBODY TOTAL: CPT

## 2024-02-15 PROCEDURE — 84443 ASSAY THYROID STIM HORMONE: CPT

## 2024-02-15 PROCEDURE — 82306 VITAMIN D 25 HYDROXY: CPT

## 2024-02-15 PROCEDURE — 80053 COMPREHEN METABOLIC PANEL: CPT

## 2024-02-15 PROCEDURE — 87389 HIV-1 AG W/HIV-1&-2 AB AG IA: CPT

## 2024-02-15 PROCEDURE — 87340 HEPATITIS B SURFACE AG IA: CPT

## 2024-02-15 PROCEDURE — 85025 COMPLETE CBC W/AUTO DIFF WBC: CPT

## 2024-02-15 PROCEDURE — 86803 HEPATITIS C AB TEST: CPT

## 2024-02-15 PROCEDURE — 83036 HEMOGLOBIN GLYCOSYLATED A1C: CPT

## 2024-02-15 PROCEDURE — 86140 C-REACTIVE PROTEIN: CPT

## 2024-02-15 NOTE — TELEPHONE ENCOUNTER
[facility] human serv.  form received on 2/15/24  to be completed by PCP. Copy made and placed in PCP folder.    Forms to be delivered to PCP mailbox at assigned time.

## 2024-02-15 NOTE — PATIENT INSTRUCTIONS
Be por nance confianza en nuestro equipo.   Le agradecemos y agradecemos marta comentarios.   Si recibe more encuesta nuestra, tómese unos momentos para informarnos cómo estamos.   Sinceramente,  Yakendricke Toussaint-Foster, DO

## 2024-02-16 LAB
HBV CORE AB SER QL: REACTIVE
HBV CORE IGM SER QL: ABNORMAL
HBV SURFACE AG SER QL: ABNORMAL
HCV AB SER QL: ABNORMAL
HIV 1+2 AB+HIV1 P24 AG SERPL QL IA: NORMAL
HIV 2 AB SERPL QL IA: NORMAL
HIV1 AB SERPL QL IA: NORMAL
HIV1 P24 AG SERPL QL IA: NORMAL

## 2024-02-20 DIAGNOSIS — Z78.9 HEPATITIS B VACCINATION STATUS UNKNOWN: Primary | ICD-10-CM

## 2024-02-21 NOTE — PROGRESS NOTES
"PROBLEM GYNECOLOGICAL VISIT    Brayan Sims is a 43 y.o. female who presents today for follow up.  Her general medical history has been reviewed and she reports it as follows:    Past Medical History:   Diagnosis Date    Chronic hip pain     Disease of thyroid gland     Osteopenia      Past Surgical History:   Procedure Laterality Date    CERVICAL BIOPSY  W/ LOOP ELECTRODE EXCISION N/A      SECTION N/A     LEFT OOPHORECTOMY Left     TOTAL ABDOMINAL HYSTERECTOMY W/ BILATERAL SALPINGOOPHORECTOMY Right      OB History          4    Para   4    Term   4            AB        Living             SAB        IAB        Ectopic        Multiple        Live Births   4               Social History     Tobacco Use    Smoking status: Never    Smokeless tobacco: Never   Vaping Use    Vaping status: Never Used   Substance Use Topics    Alcohol use: Yes     Comment: ocassianly    Drug use: Never     Social History     Substance and Sexual Activity   Sexual Activity Not Currently    Partners: Male       Current Outpatient Medications   Medication Instructions    conjugated estrogens (PREMARIN) 0.625 mg, Oral, Daily, Take daily for 21 days then do not take for 7 days.    D-1000 Extra Strength 25 MCG (1000 UT) tablet TAKE 1 TABLET BY MOUTH EVERY DAY    FLUoxetine (PROZAC) 10 mg, Oral, Daily    levothyroxine 50 mcg, Oral, Daily (early morning)       History of Present Illness:   Patient presents today for follow up s/p trial of Premarin with no new complaints.  Patient states she is please with medication.    Review of Systems:  Review of Systems   All other systems reviewed and are negative.      Physical Exam:  /79   Pulse 81   Ht 5' 7\" (1.702 m)   Wt 82.3 kg (181 lb 6.4 oz)   BMI 28.41 kg/m²   Physical Exam  Constitutional:       Appearance: Normal appearance.   Neurological:      Mental Status: She is alert.   Vitals reviewed.       Assessment:   1. Postmenopausal estrogen deficiency   2. " Postmenopausal depression    Plan:   1. Continue with premarin and lexapro   2. Return to office 8mos.       Reviewed with patient that test results are available in [facility] immediately, but that they will not necessarily be reviewed by me immediately.  Explained that I will review results at my earliest opportunity and contact patient appropriately.

## 2024-04-07 DIAGNOSIS — F32.89 POSTMENOPAUSAL DEPRESSION: ICD-10-CM

## 2024-04-07 DIAGNOSIS — R53.83 FATIGUE DUE TO DEPRESSION: ICD-10-CM

## 2024-04-07 DIAGNOSIS — F32.A FATIGUE DUE TO DEPRESSION: ICD-10-CM

## 2024-04-08 RX ORDER — FLUOXETINE 10 MG/1
CAPSULE ORAL
Qty: 90 CAPSULE | Refills: 1 | Status: SHIPPED | OUTPATIENT
Start: 2024-04-08

## 2024-05-06 ENCOUNTER — TELEPHONE (OUTPATIENT)
Dept: FAMILY MEDICINE CLINIC | Facility: CLINIC | Age: 44
End: 2024-05-06

## 2024-05-06 NOTE — TELEPHONE ENCOUNTER
Patient message:Sí, buenas tardes, yo quiero comunicarme con el dentista. Por favor, si es posible que heather el dentista que se comunique conmigo al. [phone], me llamo Duvel y Capone, mi fecha de nacimiento de [phone] velasquez      Call transfer

## 2024-06-03 ENCOUNTER — TELEPHONE (OUTPATIENT)
Dept: FAMILY MEDICINE CLINIC | Facility: CLINIC | Age: 44
End: 2024-06-03

## 2024-06-25 ENCOUNTER — OFFICE VISIT (OUTPATIENT)
Dept: FAMILY MEDICINE CLINIC | Facility: CLINIC | Age: 44
End: 2024-06-25

## 2024-06-25 VITALS
BODY MASS INDEX: 29.43 KG/M2 | HEART RATE: 70 BPM | SYSTOLIC BLOOD PRESSURE: 121 MMHG | RESPIRATION RATE: 18 BRPM | TEMPERATURE: 98.4 F | DIASTOLIC BLOOD PRESSURE: 79 MMHG | WEIGHT: 187.5 LBS | HEIGHT: 67 IN | OXYGEN SATURATION: 99 %

## 2024-06-25 DIAGNOSIS — M54.12 CERVICAL RADICULOPATHY: Primary | ICD-10-CM

## 2024-06-25 PROCEDURE — 99213 OFFICE O/P EST LOW 20 MIN: CPT

## 2024-06-25 RX ORDER — GABAPENTIN 100 MG/1
100 CAPSULE ORAL 3 TIMES DAILY
Qty: 90 CAPSULE | Refills: 5 | Status: SHIPPED | OUTPATIENT
Start: 2024-06-25

## 2024-06-25 NOTE — PROGRESS NOTES
"Ambulatory Visit  Name: Brayan Sims      : 1980      MRN: [id number]  Encounter Provider: ANAT Blank  Encounter Date: 2024   Encounter department: [facility]    Assessment & Plan   1. Cervical radiculopathy  -     XR spine cervical complete 4 or 5 vw non injury; Future; Expected date: 2024  -     EMG 2 Limb Upper Extremity; Future  -     gabapentin (NEURONTIN) 100 mg capsule; Take 1 capsule (100 mg total) by mouth 3 (three) times a day  Recommend home exercises     History of Present Illness     Brayan Sims is a 43 y.o. female  has a past medical history of Chronic hip pain, Disease of thyroid gland, and Osteopenia.  has a past surgical history that includes Total abdominal hysterectomy w/ bilateral salpingoophorectomy (Right); Cervical biopsy w/ loop electrode excision (N/A);  section (N/A); and Left oophorectomy (Left).    She reports right arm tingling, numbness, and aching pain. Sometimes symptoms are present in the left arm but not like the right arm. Denies neck pain. Present for years. In [place], they gave her a name of unknown topical and pills which did not help.  Declined PT.        Review of Systems  As per HPI      Objective     /79 (BP Location: Left arm, Patient Position: Sitting, Cuff Size: Standard)   Pulse 70   Temp 98.4 °F (36.9 °C) (Temporal)   Resp 18   Ht 5' 7\" (1.702 m)   Wt 85 kg (187 lb 8 oz)   SpO2 99%   BMI 29.37 kg/m²     Physical Exam  Vitals and nursing note reviewed.   Constitutional:       General: She is not in acute distress.     Appearance: Normal appearance. She is well-developed.   HENT:      Head: Normocephalic and atraumatic.      Right Ear: External ear normal.      Left Ear: External ear normal.      Nose: Nose normal.   Eyes:      Conjunctiva/sclera: Conjunctivae normal.   Cardiovascular:      Rate and Rhythm: Normal rate and regular rhythm.      Pulses: Normal " pulses.      Heart sounds: Normal heart sounds. No murmur heard.  Pulmonary:      Effort: Pulmonary effort is normal. No respiratory distress.      Breath sounds: Normal breath sounds.   Abdominal:      Palpations: Abdomen is soft.      Tenderness: There is no abdominal tenderness.   Musculoskeletal:         General: No swelling. Normal range of motion.      Cervical back: Normal range of motion and neck supple.      Comments: Bilateral hands: (-) tinels, (-) phalens, (-) finkelstein (-) grind test  (+) Spurling maneuver    Skin:     General: Skin is warm and dry.      Capillary Refill: Capillary refill takes less than 2 seconds.   Neurological:      Mental Status: She is alert and oriented to person, place, and time. Mental status is at baseline.   Psychiatric:         Mood and Affect: Mood normal.         Behavior: Behavior normal.         Thought Content: Thought content normal.         Judgment: Judgment normal.       Administrative Statements

## 2024-10-01 ENCOUNTER — OFFICE VISIT (OUTPATIENT)
Dept: OBGYN CLINIC | Facility: CLINIC | Age: 44
End: 2024-10-01

## 2024-10-01 VITALS
BODY MASS INDEX: 26.56 KG/M2 | HEART RATE: 65 BPM | DIASTOLIC BLOOD PRESSURE: 85 MMHG | SYSTOLIC BLOOD PRESSURE: 139 MMHG | HEIGHT: 67 IN | WEIGHT: 169.2 LBS

## 2024-10-01 DIAGNOSIS — N94.9 VAGINAL DISCOMFORT: Primary | ICD-10-CM

## 2024-10-01 DIAGNOSIS — N76.0 BV (BACTERIAL VAGINOSIS): ICD-10-CM

## 2024-10-01 DIAGNOSIS — N95.2 ATROPHIC VAGINITIS: ICD-10-CM

## 2024-10-01 DIAGNOSIS — B96.89 BV (BACTERIAL VAGINOSIS): ICD-10-CM

## 2024-10-01 PROCEDURE — 87210 SMEAR WET MOUNT SALINE/INK: CPT | Performed by: OBSTETRICS & GYNECOLOGY

## 2024-10-01 PROCEDURE — 99213 OFFICE O/P EST LOW 20 MIN: CPT | Performed by: OBSTETRICS & GYNECOLOGY

## 2024-10-01 RX ORDER — ESTRADIOL 0.1 MG/G
CREAM VAGINAL
Qty: 45 G | Refills: 6 | Status: SHIPPED | OUTPATIENT
Start: 2024-10-01

## 2024-10-01 RX ORDER — METRONIDAZOLE 500 MG/1
500 TABLET ORAL EVERY 12 HOURS SCHEDULED
Qty: 14 TABLET | Refills: 0 | Status: SHIPPED | OUTPATIENT
Start: 2024-10-01 | End: 2024-10-08

## 2024-10-01 NOTE — PROGRESS NOTES
PROBLEM GYNECOLOGICAL VISIT    Brayan Sims is a 43 y.o. female who presents today with complaint of vaginal discomfort.  Her general medical history has been reviewed and she reports it as follows:    Past Medical History:   Diagnosis Date    Chronic hip pain     Disease of thyroid gland     Osteopenia      Past Surgical History:   Procedure Laterality Date    CERVICAL BIOPSY  W/ LOOP ELECTRODE EXCISION N/A      SECTION N/A     LEFT OOPHORECTOMY Left     TOTAL ABDOMINAL HYSTERECTOMY W/ BILATERAL SALPINGOOPHORECTOMY Right      OB History          4    Para   4    Term   4            AB        Living             SAB        IAB        Ectopic        Multiple        Live Births   4               Social History     Tobacco Use    Smoking status: Never     Passive exposure: Never    Smokeless tobacco: Never   Vaping Use    Vaping status: Never Used   Substance Use Topics    Alcohol use: Yes     Comment: ocassianly    Drug use: Never     Social History     Substance and Sexual Activity   Sexual Activity Yes    Partners: Male       Current Outpatient Medications   Medication Instructions    conjugated estrogens (PREMARIN) 0.625 mg, Oral, Daily, Take daily for 21 days then do not take for 7 days.    D-1000 Extra Strength 25 MCG (1000 UT) tablet TAKE 1 TABLET BY MOUTH EVERY DAY    estradiol (ESTRACE VAGINAL) 0.1 mg/g vaginal cream 2gm application daily x2wks qhs then 2gm 3x a week.    FLUoxetine (PROzac) 10 mg capsule TOME ARDEN CAPSULA TODOS LOS DIELH    gabapentin (NEURONTIN) 100 mg, Oral, 3 times daily    levothyroxine 50 mcg, Oral, Daily (early morning)    metroNIDAZOLE (FLAGYL) 500 mg, Oral, Every 12 hours scheduled       History of Present Illness:   Patient presents with c/o vaginal discomfort after intercourse which she has not had for a couple of weeks.    Review of Systems:  Review of Systems   Genitourinary:  Positive for vaginal pain. Negative for pelvic pain, vaginal bleeding and  "vaginal discharge.        Vaginal dyscomfort   All other systems reviewed and are negative.      Physical Exam:  /85 (BP Location: Left arm, Patient Position: Sitting, Cuff Size: Standard)   Pulse 65   Ht 5' 7\" (1.702 m)   Wt 76.7 kg (169 lb 3.2 oz)   BMI 26.50 kg/m²   Physical Exam  Constitutional:       Appearance: Normal appearance.   Genitourinary:      Genitourinary Comments: Areas of fissures noted      Right Labia: lesions.      Right Labia: No rash, tenderness or skin changes.     Left Labia: lesions.      Left Labia: No tenderness, skin changes or rash.     Vulva exam comments: Bilateral fissures noted.         Neurological:      Mental Status: She is alert.   Vitals reviewed.         Point of Care Testing:   -Wet mount: abnormal      Discussion:   Discuss with patient to avoid feminine hygiene products and recommend estrogen cream for the atrophy secondary to her hysterectomy with removal of her ovaries has put her in surgical menopause.    Assessment:   1. Atrophic vaginitis   2. BV    Plan:   1. Estrace/Flagyl escribe   2. Return to office 6wks.   3. Patient's depression screening was assessed with a PHQ-2 score of 1. Clinically patient does not have depression. No treatment is required.    Reviewed with patient that test results are available in MyChart immediately, but that they will not necessarily be reviewed by me immediately.  Explained that I will review results at my earliest opportunity and contact patient appropriately.  "

## 2024-10-17 ENCOUNTER — OFFICE VISIT (OUTPATIENT)
Dept: DENTISTRY | Facility: CLINIC | Age: 44
End: 2024-10-17

## 2024-10-17 VITALS — SYSTOLIC BLOOD PRESSURE: 122 MMHG | TEMPERATURE: 98 F | HEART RATE: 70 BPM | DIASTOLIC BLOOD PRESSURE: 84 MMHG

## 2024-10-17 DIAGNOSIS — S02.5XXA CLOSED FRACTURE OF TOOTH, INITIAL ENCOUNTER: Primary | ICD-10-CM

## 2024-10-17 PROCEDURE — D0220 INTRAORAL - PERIAPICAL FIRST RADIOGRAPHIC IMAGE: HCPCS

## 2024-10-17 PROCEDURE — D0140 LIMITED ORAL EVALUATION - PROBLEM FOCUSED: HCPCS

## 2024-10-17 PROCEDURE — D2335 RESIN-BASED COMPOSITE - 4 OR MORE SURFACES OR INVOLVING INCISAL ANGLE (ANTERIOR): HCPCS

## 2024-10-17 NOTE — DENTAL PROCEDURE DETAILS
"Limited Exam    Brayan Sims 43 y.o. female presents with self to [facility] for Limited exam  PMH reviewed, no changes, ASA II. Significant medical history: GERD, obesity, cervical radiculopathy, hypothyroidism. Significant allergies: NKDA. Significant medications: Gabapentin, levothyroxine, fluoxetine.    Chief complaint:  \"My front tooth is cracked and sensitive to cold\"    Consent:  Discussed that limited exam focuses on problem area, and same day tx is not guaranteed.  Patient explained to if they wish to have anything else evaluated, they need to return to the practice at which they are a patient of record or schedule a comprehensive exam afterwards.  Patient understands and consent was given by self via verbal consent.    Subjective history:    Onset: 3 months ago.   Provocation: Cold, Drinking.   Quality: Shooting.   Region: Patient points to tooth #8 .   Severity: 4/10.   Timing: only when provoked.    Objective clinical findings:   Oral cancer screening: normal.   Extraoral exam: no remarkable findings.  Intraoral exam:  MIFL  fracture of #8. Johnson type II, enamel-dentin fracture with no pulp exposure.   Hx: Pt stated she was eating something hard 3 months ago and tooth fractured from it.      Radiographs: Single PA - #8 .     Pulp testing:  #8 Cold: Normal intensity but lingering response; Percussion: Normal; Palpation: Normal.    Assessment:  #8 MIFL enamel-dentin fracture    Plan:   #8 MIFL resin restoration    Referral(s): None needed.  Rx: None.  Comprehensive care disposition: Patient expressed interest in becoming a patient of record with one of the  dental clinics.      Composite Restoration #MIFL    Diagnosis:  #8 MIFL enamel-dentin fracture    Prognosis:  good    Consent:  Risks of specific procedure: need for RCT if pulp exposure occurs or in future if pulp is inflamed, need to revise tx plan based on extent of decay, damage to adjacent tooth and/or restoration.  Risks of any dental " procedure: post procedural pain or sensitivity, local anesthetic side effects, allergic reaction to dental materials and medications, breakage of local anesthetic needle, aspiration of small dental tools, injury to nearby hard and soft tissues and anatomical structures.  Benefits: Prevent further breakdown of tooth and its sequelae; Restore facial esthetics.  Alternatives: Ceramic crown #8, no tx.  Tx plan for composite restoration #8 MIFL reviewed. Opportunity to ask questions given, all questions answered to degree of medical and dental certainty.  Patient understands and consent given by self via verbal consent.    Anesthesia:  Topical 20% benzocaine.  1/2 carps 2% Lidocaine 1:100k epi via buccal infiltration.    Procedure details:  Isolation: cotton rolls and high volume suction  Prepped and beveled surfaces of tooth #8 MIFL with high speed handpiece.   Band placement: mylar strip and wedge.   Etch with 37% H2PO4 15 seconds. Rinsed and suctioned.  Applied  with 20 second scrub, air dried, and light cured.  Restored with packable and flowable (A2 shade) and light cured.  Checked occlusion and adjusted with finishing burs.  Checked contacts with floss  Polished with white stone burs.  Verified occlusion and contacts.    Patient dismissed ambulatory and alert.    NV: 11/7/24 for comp exam.    Attending: Dr. Montalvo checked X-rays of #8 fracture .

## 2024-11-12 ENCOUNTER — TELEPHONE (OUTPATIENT)
Dept: OBGYN CLINIC | Facility: CLINIC | Age: 44
End: 2024-11-12

## 2024-12-02 ENCOUNTER — APPOINTMENT (OUTPATIENT)
Dept: URGENT CARE | Facility: MEDICAL CENTER | Age: 44
End: 2024-12-02
Payer: OTHER MISCELLANEOUS

## 2024-12-02 PROCEDURE — G0383 LEV 4 HOSP TYPE B ED VISIT: HCPCS

## 2024-12-02 PROCEDURE — 90471 IMMUNIZATION ADMIN: CPT

## 2024-12-02 PROCEDURE — 90715 TDAP VACCINE 7 YRS/> IM: CPT

## 2024-12-02 PROCEDURE — 12001 RPR S/N/AX/GEN/TRNK 2.5CM/<: CPT

## 2024-12-02 PROCEDURE — 99284 EMERGENCY DEPT VISIT MOD MDM: CPT

## 2024-12-04 ENCOUNTER — APPOINTMENT (OUTPATIENT)
Dept: URGENT CARE | Facility: MEDICAL CENTER | Age: 44
End: 2024-12-04
Payer: OTHER MISCELLANEOUS

## 2024-12-04 PROCEDURE — 99213 OFFICE O/P EST LOW 20 MIN: CPT

## 2025-01-07 ENCOUNTER — TELEPHONE (OUTPATIENT)
Dept: OBGYN CLINIC | Facility: CLINIC | Age: 45
End: 2025-01-07

## 2025-03-25 ENCOUNTER — TELEPHONE (OUTPATIENT)
Dept: OBGYN CLINIC | Facility: CLINIC | Age: 45
End: 2025-03-25

## 2025-06-02 ENCOUNTER — OFFICE VISIT (OUTPATIENT)
Dept: FAMILY MEDICINE CLINIC | Facility: CLINIC | Age: 45
End: 2025-06-02

## 2025-06-02 VITALS
HEIGHT: 67 IN | DIASTOLIC BLOOD PRESSURE: 78 MMHG | SYSTOLIC BLOOD PRESSURE: 110 MMHG | BODY MASS INDEX: 27.55 KG/M2 | OXYGEN SATURATION: 96 % | TEMPERATURE: 97.8 F | RESPIRATION RATE: 18 BRPM | HEART RATE: 72 BPM | WEIGHT: 175.5 LBS

## 2025-06-02 DIAGNOSIS — E55.9 VITAMIN D DEFICIENCY: ICD-10-CM

## 2025-06-02 DIAGNOSIS — E03.8 OTHER SPECIFIED HYPOTHYROIDISM: ICD-10-CM

## 2025-06-02 DIAGNOSIS — E66.3 OVERWEIGHT (BMI 25.0-29.9): ICD-10-CM

## 2025-06-02 DIAGNOSIS — Z00.00 ANNUAL PHYSICAL EXAM: Primary | ICD-10-CM

## 2025-06-02 DIAGNOSIS — M54.12 CERVICAL RADICULOPATHY: ICD-10-CM

## 2025-06-02 PROCEDURE — 99396 PREV VISIT EST AGE 40-64: CPT

## 2025-06-02 PROCEDURE — 99214 OFFICE O/P EST MOD 30 MIN: CPT

## 2025-06-02 RX ORDER — GABAPENTIN 300 MG/1
300 CAPSULE ORAL
Qty: 90 CAPSULE | Refills: 0 | Status: SHIPPED | OUTPATIENT
Start: 2025-06-02

## 2025-06-02 RX ORDER — METHOCARBAMOL 500 MG/1
500 TABLET, FILM COATED ORAL 4 TIMES DAILY
Qty: 60 TABLET | Refills: 0 | Status: SHIPPED | OUTPATIENT
Start: 2025-06-02

## 2025-06-02 NOTE — PROGRESS NOTES
Adult Annual Physical  Name: Brayan Pritchett      : 1980      MRN: [id number]  Encounter Provider: ANAT Blank  Encounter Date: 2025   Encounter department: [facility]    :  Assessment & Plan  Annual physical exam         Cervical radiculopathy    Orders:    gabapentin (Neurontin) 300 mg capsule; Take 1 capsule (300 mg total) by mouth daily at bedtime    Ambulatory Referral to Chiropractic; Future    Ambulatory referral to Spine & Pain Management; Future    methocarbamol (ROBAXIN) 500 mg tablet; Take 1 tablet (500 mg total) by mouth 4 (four) times a day    Ambulatory Referral to Physical Therapy; Future    Overweight (BMI 25.0-29.9)    Orders:    Basic metabolic panel; Future    Hemoglobin A1C; Future    Lipid Panel with Direct LDL reflex; Future    TSH, 3rd generation with Free T4 reflex; Future    Vitamin D deficiency    Orders:    Cholecalciferol (D-1000 Extra Strength) 1,000 units tablet; Take 1 tablet (1,000 Units total) by mouth daily    Other specified hypothyroidism    Orders:    TSH, 3rd generation with Free T4 reflex; Future        Preventive Screenings:    - Hepatitis C screening: screening up-to-date   - HIV screening: screening up-to-date   - Cervical cancer screening: screening not indicated   - Breast cancer screening: screening up-to-date   - Lung cancer screening: screening not indicated     BMI Counseling: Body mass index is 27.49 kg/m². The BMI is above normal. Nutrition recommendations include decreasing portion sizes, encouraging healthy choices of fruits and vegetables, decreasing fast food intake, consuming healthier snacks, limiting drinks that contain sugar, moderation in carbohydrate intake, increasing intake of lean protein, reducing intake of saturated and trans fat and reducing intake of cholesterol. Exercise recommendations include moderate physical activity 150 minutes/week. No pharmacotherapy was ordered. Rationale  for BMI follow-up plan is due to patient being overweight or obese.         History of Present Illness     Adult Annual Physical:  Patient presents for annual physical. She continues to have cervical pain that radiates to the right arm and sometimes to the left arm. She is not using any medications. She went to orthopedic and they recommended PT but she could not do it because she was uninsured but she is insured now. She took gabapentin 100 mg TID in the past but it caused drowsiness and she drives a forklift at work. 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 She has not tried medication for this. She has also not been taking her thyroid medication for  > 4 months and has had unintentional weight loss..     Diet and Physical Activity:  - Diet/Nutrition: no special diet.  - Exercise: no formal exercise.    Depression Screening:  - PHQ-2 Score: 0    General Health:  - Sleep: 7-8 hours of sleep on average, 4-6 hours of sleep on average, unrefreshing sleep and snores loudly. 6-8hrs  - Hearing: normal hearing bilateral ears.  - Vision: wears contacts and most recent eye exam > 1 year ago.  - Dental: regular dental visits, brushes teeth twice daily and does not floss.    /GYN Health:  - Follows with GYN: no.   - History of STDs: no  - Contraception: hysterectomy.      Advanced Care Planning:  - Has an advanced directive?: no    - Has a durable medical POA?: no      Review of Systems   Constitutional:  Negative for chills and fever.   HENT:  Negative for ear pain and sore throat.    Eyes:  Negative for pain and visual disturbance.   Respiratory:  Negative for cough and shortness of breath.    Cardiovascular:  Negative for chest pain and palpitations.   Gastrointestinal:  Negative for abdominal pain and vomiting.   Genitourinary:  Negative for dysuria and hematuria.   Musculoskeletal:  Negative for arthralgias and back pain.   Skin:  Negative for color change and rash.   Neurological:  Negative for  "seizures and syncope.   All other systems reviewed and are negative.        Objective   /78 (BP Location: Left arm, Patient Position: Sitting, Cuff Size: Standard)   Pulse 72   Temp 97.8 °F (36.6 °C) (Temporal)   Resp 18   Ht 5' 7\" (1.702 m)   Wt 79.6 kg (175 lb 8 oz)   SpO2 96%   BMI 27.49 kg/m²     Physical Exam  Vitals and nursing note reviewed.   Constitutional:       General: She is not in acute distress.     Appearance: Normal appearance. She is well-developed.   HENT:      Head: Normocephalic and atraumatic.      Right Ear: External ear normal.      Left Ear: External ear normal.      Nose: Nose normal.     Eyes:      Conjunctiva/sclera: Conjunctivae normal.       Cardiovascular:      Rate and Rhythm: Normal rate and regular rhythm.      Pulses: Normal pulses.      Heart sounds: Normal heart sounds. No murmur heard.  Pulmonary:      Effort: Pulmonary effort is normal. No respiratory distress.      Breath sounds: Normal breath sounds.   Abdominal:      Palpations: Abdomen is soft.      Tenderness: There is no abdominal tenderness.     Musculoskeletal:         General: No swelling. Normal range of motion.      Cervical back: Normal range of motion and neck supple.     Skin:     General: Skin is warm and dry.      Capillary Refill: Capillary refill takes less than 2 seconds.     Neurological:      Mental Status: She is alert and oriented to person, place, and time. Mental status is at baseline.     Psychiatric:         Mood and Affect: Mood normal.         Behavior: Behavior normal.         Thought Content: Thought content normal.         Judgment: Judgment normal.         "

## 2025-06-02 NOTE — PATIENT INSTRUCTIONS
"   [phone]: Chiropractor  [phone]: dolor  Therapy: [phone]      Routine physical for adults   The Basics   Written by the doctors and editors at [facility]   What is a physical? -- A physical is a routine visit, or \"check-up,\" with your doctor. You might also hear it called a \"wellness visit\" or \"preventive visit.\"  During each visit, the doctor will:   Ask about your physical and mental health   Ask about your habits, behaviors, and lifestyle   Do an exam   Give you vaccines if needed   Talk to you about any medicines you take   Give advice about your health   Answer your questions  Getting regular check-ups is an important part of taking care of your health. It can help your doctor find and treat any problems you have. But it's also important for preventing health problems.  A routine physical is different from a \"sick visit.\" A sick visit is when you see a doctor because of a health concern or problem. Since physicals are scheduled ahead of time, you can think about what you want to ask the doctor.  How often should I get a physical? -- It depends on your age and health. In general, for people age 21 years and older:   If you are younger than 50 years, you might be able to get a physical every 3 years.   If you are 50 years or older, your doctor might recommend a physical every year.  If you have an ongoing health condition, like diabetes or high blood pressure, your doctor will probably want to see you more often.  What happens during a physical? -- In general, each visit will include:   Physical exam - The doctor or nurse will check your height, weight, heart rate, and blood pressure. They will also look at your eyes and ears. They will ask about how you are feeling and whether you have any symptoms that bother you.   Medicines - It's a good idea to bring a list of all the medicines you take to each doctor visit. Your doctor will talk to you about your medicines and answer any questions. Tell " "them if you are having any side effects that bother you. You should also tell them if you are having trouble paying for any of your medicines.   Habits and behaviors - This includes:   Your diet   Your exercise habits   Whether you smoke, drink alcohol, or use drugs   Whether you are sexually active   Whether you feel safe at home  Your doctor will talk to you about things you can do to improve your health and lower your risk of health problems. They will also offer help and support. For example, if you want to quit smoking, they can give you advice and might prescribe medicines. If you want to improve your diet or get more physical activity, they can help you with this, too.   Lab tests, if needed - The tests you get will depend on your age and situation. For example, your doctor might want to check your:   Cholesterol   Blood sugar   Iron level   Vaccines - The recommended vaccines will depend on your age, health, and what vaccines you already had. Vaccines are very important because they can prevent certain serious or deadly infections.   Discussion of screening - \"Screening\" means checking for diseases or other health problems before they cause symptoms. Your doctor can recommend screening based on your age, risk, and preferences. This might include tests to check for:   Cancer, such as breast, prostate, cervical, ovarian, colorectal, prostate, lung, or skin cancer   Sexually transmitted infections, such as chlamydia and gonorrhea   Mental health conditions like depression and anxiety  Your doctor will talk to you about the different types of screening tests. They can help you decide which screenings to have. They can also explain what the results might mean.   Answering questions - The physical is a good time to ask the doctor or nurse questions about your health. If needed, they can refer you to other doctors or specialists, too.  Adults older than 65 years often need other care, too. As you get older, your " doctor will talk to you about:   How to prevent falling at home   Hearing or vision tests   Memory testing   How to take your medicines safely   Making sure that you have the help and support you need at home  All topics are updated as new evidence becomes available and our peer review process is complete.  This topic retrieved from [employer] on: May 02, 2024.  Topic 061258 Version 1.0  Release: 32.4.3 - C32.122  © 2024 UpToDate, Inc. and/or its affiliates. All rights reserved.  Consumer Information Use and Disclaimer   Disclaimer: This generalized information is a limited summary of diagnosis, treatment, and/or medication information. It is not meant to be comprehensive and should be used as a tool to help the user understand and/or assess potential diagnostic and treatment options. It does NOT include all information about conditions, treatments, medications, side effects, or risks that may apply to a specific patient. It is not intended to be medical advice or a substitute for the medical advice, diagnosis, or treatment of a health care provider based on the health care provider's examination and assessment of a patient's specific and unique circumstances. Patients must speak with a health care provider for complete information about their health, medical questions, and treatment options, including any risks or benefits regarding use of medications. This information does not endorse any treatments or medications as safe, effective, or approved for treating a specific patient. UpToDate, Inc. and its affiliates disclaim any warranty or liability relating to this information or the use thereof.The use of this information is governed by the Terms of Use, available at https://www.wolterskluwer.com/en/know/clinical-effectiveness-terms. 2024© UpToDate, Inc. and its affiliates and/or licensors. All rights reserved.  Copyright   © 2024 UpToDate, Inc. and/or its affiliates. All rights reserved.    Patient Education    "  Routine physical for adults   The Basics   Written by the doctors and editors at [facility]   What is a physical? -- A physical is a routine visit, or \"check-up,\" with your doctor. You might also hear it called a \"wellness visit\" or \"preventive visit.\"  During each visit, the doctor will:   Ask about your physical and mental health   Ask about your habits, behaviors, and lifestyle   Do an exam   Give you vaccines if needed   Talk to you about any medicines you take   Give advice about your health   Answer your questions  Getting regular check-ups is an important part of taking care of your health. It can help your doctor find and treat any problems you have. But it's also important for preventing health problems.  A routine physical is different from a \"sick visit.\" A sick visit is when you see a doctor because of a health concern or problem. Since physicals are scheduled ahead of time, you can think about what you want to ask the doctor.  How often should I get a physical? -- It depends on your age and health. In general, for people age 21 years and older:   If you are younger than 50 years, you might be able to get a physical every 3 years.   If you are 50 years or older, your doctor might recommend a physical every year.  If you have an ongoing health condition, like diabetes or high blood pressure, your doctor will probably want to see you more often.  What happens during a physical? -- In general, each visit will include:   Physical exam - The doctor or nurse will check your height, weight, heart rate, and blood pressure. They will also look at your eyes and ears. They will ask about how you are feeling and whether you have any symptoms that bother you.   Medicines - It's a good idea to bring a list of all the medicines you take to each doctor visit. Your doctor will talk to you about your medicines and answer any questions. Tell them if you are having any side effects that bother you. You should also tell them " "if you are having trouble paying for any of your medicines.   Habits and behaviors - This includes:   Your diet   Your exercise habits   Whether you smoke, drink alcohol, or use drugs   Whether you are sexually active   Whether you feel safe at home  Your doctor will talk to you about things you can do to improve your health and lower your risk of health problems. They will also offer help and support. For example, if you want to quit smoking, they can give you advice and might prescribe medicines. If you want to improve your diet or get more physical activity, they can help you with this, too.   Lab tests, if needed - The tests you get will depend on your age and situation. For example, your doctor might want to check your:   Cholesterol   Blood sugar   Iron level   Vaccines - The recommended vaccines will depend on your age, health, and what vaccines you already had. Vaccines are very important because they can prevent certain serious or deadly infections.   Discussion of screening - \"Screening\" means checking for diseases or other health problems before they cause symptoms. Your doctor can recommend screening based on your age, risk, and preferences. This might include tests to check for:   Cancer, such as breast, prostate, cervical, ovarian, colorectal, prostate, lung, or skin cancer   Sexually transmitted infections, such as chlamydia and gonorrhea   Mental health conditions like depression and anxiety  Your doctor will talk to you about the different types of screening tests. They can help you decide which screenings to have. They can also explain what the results might mean.   Answering questions - The physical is a good time to ask the doctor or nurse questions about your health. If needed, they can refer you to other doctors or specialists, too.  Adults older than 65 years often need other care, too. As you get older, your doctor will talk to you about:   How to prevent falling at home   Hearing or vision " tests   Memory testing   How to take your medicines safely   Making sure that you have the help and support you need at home  All topics are updated as new evidence becomes available and our peer review process is complete.  This topic retrieved from [employer] on: May 02, 2024.  Topic 651638 Version 1.0  Release: 32.4.3 - C32.122  © 2024 UpToDate, Inc. and/or its affiliates. All rights reserved.  Consumer Information Use and Disclaimer   Disclaimer: This generalized information is a limited summary of diagnosis, treatment, and/or medication information. It is not meant to be comprehensive and should be used as a tool to help the user understand and/or assess potential diagnostic and treatment options. It does NOT include all information about conditions, treatments, medications, side effects, or risks that may apply to a specific patient. It is not intended to be medical advice or a substitute for the medical advice, diagnosis, or treatment of a health care provider based on the health care provider's examination and assessment of a patient's specific and unique circumstances. Patients must speak with a health care provider for complete information about their health, medical questions, and treatment options, including any risks or benefits regarding use of medications. This information does not endorse any treatments or medications as safe, effective, or approved for treating a specific patient. UpToDate, Inc. and its affiliates disclaim any warranty or liability relating to this information or the use thereof.The use of this information is governed by the Terms of Use, available at https://www.woltersInfotopuwer.com/en/know/clinical-effectiveness-terms. 2024© UpToDate, Inc. and its affiliates and/or licensors. All rights reserved.  Copyright   © 2024 UpToDate, Inc. and/or its affiliates. All rights reserved.

## 2025-06-02 NOTE — ASSESSMENT & PLAN NOTE
Orders:    Cholecalciferol (D-1000 Extra Strength) 1,000 units tablet; Take 1 tablet (1,000 Units total) by mouth daily

## 2025-06-02 NOTE — ASSESSMENT & PLAN NOTE
Orders:    gabapentin (Neurontin) 300 mg capsule; Take 1 capsule (300 mg total) by mouth daily at bedtime    Ambulatory Referral to Chiropractic; Future    Ambulatory referral to Spine & Pain Management; Future    methocarbamol (ROBAXIN) 500 mg tablet; Take 1 tablet (500 mg total) by mouth 4 (four) times a day    Ambulatory Referral to Physical Therapy; Future

## 2025-06-03 ENCOUNTER — APPOINTMENT (OUTPATIENT)
Dept: LAB | Facility: CLINIC | Age: 45
End: 2025-06-03
Payer: COMMERCIAL

## 2025-06-03 DIAGNOSIS — E66.3 OVERWEIGHT (BMI 25.0-29.9): ICD-10-CM

## 2025-06-03 DIAGNOSIS — E03.8 OTHER SPECIFIED HYPOTHYROIDISM: ICD-10-CM

## 2025-06-03 LAB
ANION GAP SERPL CALCULATED.3IONS-SCNC: 9 MMOL/L (ref 4–13)
BUN SERPL-MCNC: 15 MG/DL (ref 5–25)
CALCIUM SERPL-MCNC: 9.5 MG/DL (ref 8.4–10.2)
CHLORIDE SERPL-SCNC: 102 MMOL/L (ref 96–108)
CHOLEST SERPL-MCNC: 253 MG/DL (ref ?–200)
CO2 SERPL-SCNC: 30 MMOL/L (ref 21–32)
CREAT SERPL-MCNC: 0.72 MG/DL (ref 0.6–1.3)
EST. AVERAGE GLUCOSE BLD GHB EST-MCNC: 117 MG/DL
GFR SERPL CREATININE-BSD FRML MDRD: 103 ML/MIN/1.73SQ M
GLUCOSE P FAST SERPL-MCNC: 84 MG/DL (ref 65–99)
HBA1C MFR BLD: 5.7 %
HDLC SERPL-MCNC: 99 MG/DL
LDLC SERPL CALC-MCNC: 136 MG/DL (ref 0–100)
POTASSIUM SERPL-SCNC: 3.9 MMOL/L (ref 3.5–5.3)
SODIUM SERPL-SCNC: 141 MMOL/L (ref 135–147)
TRIGL SERPL-MCNC: 92 MG/DL (ref ?–150)
TSH SERPL DL<=0.05 MIU/L-ACNC: 3.85 UIU/ML (ref 0.45–4.5)

## 2025-06-03 PROCEDURE — 36415 COLL VENOUS BLD VENIPUNCTURE: CPT

## 2025-06-03 PROCEDURE — 83036 HEMOGLOBIN GLYCOSYLATED A1C: CPT

## 2025-06-03 PROCEDURE — 84443 ASSAY THYROID STIM HORMONE: CPT

## 2025-06-03 PROCEDURE — 80061 LIPID PANEL: CPT

## 2025-06-03 PROCEDURE — 80048 BASIC METABOLIC PNL TOTAL CA: CPT

## 2025-06-04 ENCOUNTER — RESULTS FOLLOW-UP (OUTPATIENT)
Dept: FAMILY MEDICINE CLINIC | Facility: CLINIC | Age: 45
End: 2025-06-04

## 2025-06-05 NOTE — TELEPHONE ENCOUNTER
Patient called regarding the results and was advice of doctor's recommendation. Patient verbalize that she understood doctor's recommendation and wants to know the results of her thyroids.Please advice.

## 2025-06-24 ENCOUNTER — HOSPITAL ENCOUNTER (OUTPATIENT)
Dept: NEUROLOGY | Facility: CLINIC | Age: 45
Discharge: HOME | End: 2025-06-24
Payer: COMMERCIAL

## 2025-06-24 DIAGNOSIS — M54.12 CERVICAL RADICULOPATHY: ICD-10-CM

## 2025-06-24 PROBLEM — G56.01 RIGHT CARPAL TUNNEL SYNDROME: Status: ACTIVE | Noted: 2025-06-24

## 2025-06-24 PROCEDURE — 95886 MUSC TEST DONE W/N TEST COMP: CPT | Performed by: PSYCHIATRY & NEUROLOGY

## 2025-06-24 PROCEDURE — 95912 NRV CNDJ TEST 11-12 STUDIES: CPT | Performed by: PSYCHIATRY & NEUROLOGY

## 2025-06-26 ENCOUNTER — OFFICE VISIT (OUTPATIENT)
Age: 45
End: 2025-06-26
Payer: COMMERCIAL

## 2025-06-26 VITALS — HEIGHT: 67 IN | BODY MASS INDEX: 27.47 KG/M2 | WEIGHT: 175 LBS

## 2025-06-26 DIAGNOSIS — M54.2 NECK PAIN: Primary | ICD-10-CM

## 2025-06-26 DIAGNOSIS — M25.511 CHRONIC RIGHT SHOULDER PAIN: ICD-10-CM

## 2025-06-26 DIAGNOSIS — G89.29 CHRONIC RIGHT SHOULDER PAIN: ICD-10-CM

## 2025-06-26 DIAGNOSIS — M54.12 CERVICAL RADICULOPATHY: ICD-10-CM

## 2025-06-26 DIAGNOSIS — G56.00 CARPAL TUNNEL SYNDROME, UNSPECIFIED LATERALITY: Primary | ICD-10-CM

## 2025-06-26 PROCEDURE — 98940 CHIROPRACT MANJ 1-2 REGIONS: CPT | Performed by: CHIROPRACTOR

## 2025-06-26 PROCEDURE — 99203 OFFICE O/P NEW LOW 30 MIN: CPT | Performed by: CHIROPRACTOR

## 2025-06-26 NOTE — PROGRESS NOTES
Assessment/Plan:    1. Neck pain        2. Cervical radiculopathy  Ambulatory Referral to Chiropractic      3. Chronic right shoulder pain  Ambulatory Referral to Orthopedic Surgery        Review of Diagnostic Studies and Office Notes:    The patient's EMG results, PCP notes were reviewed prior to the chiropractic evaluation today.      EMG report from 6/24/25  Findings: 1) Normal bilateral median and ulnar motor conduction studies with F waves 2) Normal bilateral ulnar and superficial radial sensory conduction studies 3) Normal left median sensory conduction study. The right median sensory conduction study is significant for a prolonged peak latency and a diminished peak amplitude. 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 This is compatible with the clinical diagnosis of carpal tunnel syndrome. ___________________________________ Stephen Riley M.D        Decision and Treatment Plan:    I reviewed my findings with the patient today.  Patient was interested in receiving chiropractic care and was treated today.  We will treat the patient 1x/week for the next three weeks and re-evaluate. If there is improvement in symptoms and objective findings, frequency will be reduced.       The patient will be treated with conservative chiropractic care including myofascial release, joint mobilization, and a home stretching and icing program.    Patient Instructions:  Return for treatment once next week.    Advised patient we will address the cervical spine component of her condition.  Cervical spine issues may be secondary to the shoulder.    A primary source of her pain may be the within the right shoulder so we will  have her consult with an orthopedist to have the right shoulder evaluated.    Additionally she had an EMG that showed carpal tunnel syndrome on the right and she has a referral from her PCP for hand and wrist specialist.  She has not made the appointment yet.  I advised her to make that appointment with the hand wrist orthopedist on her way out of the office today.    Time/Reviewed with Patient:    Time:  At least 33 minutes of time was spent with the patient during the consultation including the patient's history/current complaints, physical exam, reviewing the diagnostic report, reviewing home instruction/reducing risk factors with the patient, reviewing my findings with the patient, and discussing the treatment with the patient.     This is a separate identifiable portion of today's visit from the E and M code billed.    Treatment: Gentle myofascial release to the cervical paraspinals, right upper trapezius, right levator Scap and rhomboids.    Manipulation was performed to C7-T1, T3-4 and T5-6 utilizing activator technique.  No HVLA was performed.    Review of Systems   Constitutional:  Negative for chills, fever and unexpected weight change.   Gastrointestinal:  Negative for constipation and diarrhea.   Genitourinary:  Negative for difficulty urinating and urgency.       Subjective:      Brayan Pritchett is a 44 y.o. female presents today for chiropractic evaluation and possible treatment.  She states her PCP referred her over for evaluation.  She has a chief complaint of right-sided shoulder pain.  She also complains of right sided neck pain.  She also complains of numbness and tingling in the right forearm and hand.  She states her symptoms have been going on for approximately 6 years.  She states up until recently she did not have insurance so not much was done about it.  It appears she did see her PCP a year ago and was referred for x-rays of the cervical spine as well as an EMG at that time.  She did  "not get the EMG until recently as mentioned due to insurance issues.  Cervical spine x-rays were performed on 3/6/2025 at [facility].  X-rays of the right shoulder were also done at that time.  She states she received an injection into her shoulder at that time by Dr. Pearl and did feel about 2 weeks of relief.  Pain did return.  Just prior to the 3/6/2025 visit with Dr. Pearl she went to the emergency room on 3/4/2025.  It appears she was referred to an orthopedist and given Mobic.      The referral for chiropractic care was done on 6/2/2025 per PCP assuming there was likely cervical radiculopathy present.  She was also referred to spine pain but did not make an appointment for that.  She was also referred to physical therapy but did not make an appointment for that.  She did see her PCP on 6/25/2024.  It appears the EMG was done on 6/24/2025.    More recently on the 6/25/2025 visit with her PCP she was referred to hand and wrist specialist due to the EMG showing carpal tunnel syndrome.  She has not made that appointment yet with the hand and wrist specialist.  She was again advised to do so.    Translation was done by staff here.    Objective:     Ht 5' 7\" (1.702 m)   Wt 79.4 kg (175 lb)   BMI 27.41 kg/m²     Appearance: Well developed, well nourished, and in no acute distress    Alert and oriented x 3    Mood/Affect: calm and pleasant    Gait/Posture:    Gait: Normal    Antalgic posture: None    Lordosis: Cervical- decreased    Lordosis: Lumbar- normal    Kyphosis: Thoracic- normal    Upper extremity reflexes:    Deep tendon reflexes were normal and symmetrical in the upper extremities.    Upper Extremity Muscle Testing:    Muscle testing of the bilateral upper extremities was normal and graded +5/5.    Sensory:    Sensation to light touch was normal and symmetrical in the bilateral upper extremities.    Martino's was negative bilaterally.    Cervical Orthopedic Tests:    Maximal foraminal Compression on the Right: " negative .    Maximal foraminal Compression on the Left: negative .    Phalen's test: negative on the right.    Tinel's was negative at the right wrist.    Active Cervical Ranges of Motion:    Cervical range of motion examination shows flexion to be full and pain-free.  Extension is full and pain-free.  Rotation is full and pain-free with the exception of some mild pulling noted at the end range of right rotation.    Right shoulder range of motion is mildly limited with pain in abduction as well as external rotation.    Palpation:    Moderate spasm and tenderness is noted in the right upper trapezius, right levator scapula, right rhomboids.    Decreased mobility and tenderness is noted at T3-4, T5-6, C7 and T1.         Dictation voice to text software has been used in the creation of this document. Please consider this in light of any contextual or grammatical errors.

## 2025-07-03 ENCOUNTER — OFFICE VISIT (OUTPATIENT)
Age: 45
End: 2025-07-03
Payer: COMMERCIAL

## 2025-07-03 ENCOUNTER — APPOINTMENT (OUTPATIENT)
Age: 45
End: 2025-07-03
Attending: ORTHOPAEDIC SURGERY
Payer: COMMERCIAL

## 2025-07-03 VITALS — HEIGHT: 67 IN | BODY MASS INDEX: 27.47 KG/M2 | WEIGHT: 175.04 LBS

## 2025-07-03 DIAGNOSIS — M25.511 CHRONIC RIGHT SHOULDER PAIN: ICD-10-CM

## 2025-07-03 DIAGNOSIS — M77.8 TENDINITIS OF RIGHT SHOULDER: Primary | ICD-10-CM

## 2025-07-03 DIAGNOSIS — G56.01 CARPAL TUNNEL SYNDROME ON RIGHT: ICD-10-CM

## 2025-07-03 DIAGNOSIS — G89.29 CHRONIC RIGHT SHOULDER PAIN: ICD-10-CM

## 2025-07-03 PROCEDURE — 20610 DRAIN/INJ JOINT/BURSA W/O US: CPT | Performed by: ORTHOPAEDIC SURGERY

## 2025-07-03 PROCEDURE — 73030 X-RAY EXAM OF SHOULDER: CPT

## 2025-07-03 PROCEDURE — 99204 OFFICE O/P NEW MOD 45 MIN: CPT | Performed by: ORTHOPAEDIC SURGERY

## 2025-07-03 RX ORDER — LIDOCAINE HYDROCHLORIDE 10 MG/ML
4 INJECTION, SOLUTION INFILTRATION; PERINEURAL
Status: COMPLETED | OUTPATIENT
Start: 2025-07-03 | End: 2025-07-03

## 2025-07-03 RX ORDER — TRIAMCINOLONE ACETONIDE 40 MG/ML
40 INJECTION, SUSPENSION INTRA-ARTICULAR; INTRAMUSCULAR
Status: COMPLETED | OUTPATIENT
Start: 2025-07-03 | End: 2025-07-03

## 2025-07-03 RX ADMIN — LIDOCAINE HYDROCHLORIDE 4 ML: 10 INJECTION, SOLUTION INFILTRATION; PERINEURAL at 15:30

## 2025-07-03 RX ADMIN — TRIAMCINOLONE ACETONIDE 40 MG: 40 INJECTION, SUSPENSION INTRA-ARTICULAR; INTRAMUSCULAR at 15:30

## 2025-07-03 NOTE — PROGRESS NOTES
:  Assessment & Plan  Tendinitis of right shoulder    Orders:    Ambulatory Referral to Orthopedic Surgery    XR shoulder 2+ vw right; Future    Ambulatory Referral to Physical Therapy; Future    Large joint arthrocentesis: R glenohumeral    PT referral was placed today   Provided patient with HEP  Patient received a CSI injection today   Follow up in 4 weeks  Will obtain MRI of the RIGHT shoulder if any persistent symptoms    Also discussed the carpal tunnel; does have some tingling and prior EMG obtained. If this persists will refer to hand. The shoulder is more bothersome today.         REASON FOR VISIT  Chief Complaint   Patient presents with    Right Shoulder - Pain       HISTORY OF PRESENT ILLNESS:  Brayan Pritchett is a 44 y.o. year old right hand dominant female who presents with RIGHT shoulder pain.   Pain lateral and posterior shoulder  Worse with reaching or lifting    Numbness in arm to fingertips.   Years of pain no DUY.   No PT, takes OTC meds PRN  Bothers mostrly at night.   Previously had a CSI in shoulder, months ago, only few weeks of relief      Past Medical and Surgical History:  Past Medical History[1]    Past Surgical History[2]    Medications:  Current Medications[3]    Allergies:  Allergies[4]      PE:  Pertinent Positive Findings in shoulder exam:    Motion (AROM-PROM):    Forward Flexion R: 160 L : 160   Abduction: R: 140 L: 140   External Rotation: R: 60 L : 60  Internal rotation Adduction: R: T10 L: T10  ABduction/ER: Right 80@ 90 degrees, Left: 80@ 90 degrees   Abduction/lR: Right 80@ 90 degrees, Left: 80@ 90 degrees      RIGHT shoulder:    Supraspinatus strength 5/5   lnfraspinatus strength 5/5   Tenderness [x] AC joint [x] Biceps Groove [] None   Cross body adduction [] Positive [x] Negative   Vuong' [] Positive [x] Negative   Neer's [] Positive [x] Negative   Empty Can [x] Positive [] Negative   ER lag [] Positive [x] Negative   Horn blower's [] Positive [x] Negative   Belly  Press [] Positive [x] Negative   Lift Off [] Positive [x] Negative   Bear Hug [] Positive [x] Negative   Apache's sign [x] Positive [] Negative   Speed's sign [] Positive [x] Negative         Sensory: Intact sensation in axillary, lateral antebrachial cutaneous, median, superficial branch radial, and ulnar nerve distributions.    Vascular exam: warm and well perfused digits.     Skin: intact, no rashes or lesions.      Radiology: I independently reviewed and interpreted the images.  Radiographs: RIGHT shoulder X-Ray: no significant signs of arthritis with preserved joint space    Large joint arthrocentesis: R glenohumeral    Performed by: Camilo Maurer MD  Authorized by: Camilo Maurer MD    Universal Protocol:  Consent: Verbal consent obtained  Risks and benefits: risks, benefits and alternatives were discussed  Consent given by: patient  Patient understanding: patient states understanding of the procedure being performed  Patient identity confirmed: verbally with patient  Supporting Documentation  Indications: pain     Is this a Visco injection? NoProcedure Details  Location: shoulder - R glenohumeral  Needle size: 22 G  Approach: anterolateral  Medications administered: 4 mL lidocaine 1 %; 40 mg triamcinolone acetonide 40 mg/mL    Patient tolerance: patient tolerated the procedure well with no immediate complications  Dressing:  Sterile dressing applied          EMG 6/24/25:   Median neuropathy of the right wrist      CC:  Ana Cristina Solomon, ANAT Solomon, Ana Cristina Rogers, *    Scribe Attestation      I,:  Lakeshia Napier am acting as a scribe while in the presence of the attending physician.:       I,:  Camilo Maurer MD personally performed the services described in this documentation    as scribed in my presence.:                  [1]   Past Medical History:  Diagnosis Date    Chronic hip pain     Disease of thyroid gland     Osteopenia    [2]   Past Surgical History:  Procedure Laterality Date    CERVICAL  BIOPSY  W/ LOOP ELECTRODE EXCISION N/A      SECTION N/A     LEFT OOPHORECTOMY Left     TOTAL ABDOMINAL HYSTERECTOMY W/ BILATERAL SALPINGOOPHORECTOMY Right    [3]   Current Outpatient Medications:     Cholecalciferol (D-1000 Extra Strength) 1,000 units tablet, Take 1 tablet (1,000 Units total) by mouth daily, Disp: 90 tablet, Rfl: 1    gabapentin (Neurontin) 300 mg capsule, Take 1 capsule (300 mg total) by mouth daily at bedtime, Disp: 90 capsule, Rfl: 0    methocarbamol (ROBAXIN) 500 mg tablet, Take 1 tablet (500 mg total) by mouth 4 (four) times a day, Disp: 60 tablet, Rfl: 0  [4] No Known Allergies